# Patient Record
Sex: FEMALE | Race: BLACK OR AFRICAN AMERICAN | Employment: OTHER | URBAN - METROPOLITAN AREA
[De-identification: names, ages, dates, MRNs, and addresses within clinical notes are randomized per-mention and may not be internally consistent; named-entity substitution may affect disease eponyms.]

---

## 2017-01-03 ENCOUNTER — TELEPHONE (OUTPATIENT)
Dept: FAMILY MEDICINE CLINIC | Facility: CLINIC | Age: 72
End: 2017-01-03

## 2017-01-03 ENCOUNTER — OFFICE VISIT (OUTPATIENT)
Dept: SURGERY | Facility: CLINIC | Age: 72
End: 2017-01-03

## 2017-01-03 VITALS — WEIGHT: 220 LBS | TEMPERATURE: 99 F | BODY MASS INDEX: 38.98 KG/M2 | HEIGHT: 63 IN

## 2017-01-03 DIAGNOSIS — K91.89 POSTOPERATIVE BILE LEAK: Primary | ICD-10-CM

## 2017-01-03 DIAGNOSIS — K80.50 BILIARY COLIC: ICD-10-CM

## 2017-01-03 DIAGNOSIS — K83.8 POSTOPERATIVE BILE LEAK: Primary | ICD-10-CM

## 2017-01-03 DIAGNOSIS — M96.1 POSTLAMINECTOMY SYNDROME, LUMBAR REGION: Primary | ICD-10-CM

## 2017-01-03 DIAGNOSIS — K80.20 CALCULUS OF GALLBLADDER WITHOUT CHOLECYSTITIS WITHOUT OBSTRUCTION: ICD-10-CM

## 2017-01-03 PROCEDURE — 99024 POSTOP FOLLOW-UP VISIT: CPT | Performed by: COLON & RECTAL SURGERY

## 2017-01-03 RX ORDER — HYDROCODONE BITARTRATE AND ACETAMINOPHEN 10; 325 MG/1; MG/1
1 TABLET ORAL EVERY 8 HOURS PRN
Qty: 90 TABLET | Refills: 0 | Status: SHIPPED | OUTPATIENT
Start: 2017-01-03 | End: 2017-02-21

## 2017-01-03 NOTE — TELEPHONE ENCOUNTER
According to the [facility] prescribing monitoring website her last prescription was 11 27 of 2016 for 90. There does not appear to be a prescription filled at the end of December. Okay to refill.

## 2017-01-03 NOTE — PROGRESS NOTES
Post Operative Visit Note       Active Problems  1. Postoperative bile leak    2. Biliary colic    3.  Calculus of gallbladder without cholecystitis without obstruction         Chief Complaint   Post-Op     History of Present Illness   This patient presents neck    HYSTERECTOMY      OTHER SURGICAL HISTORY      Comment stimulator,spine x 2    OTHER SURGICAL HISTORY      Comment right heal spur    OTHER SURGICAL HISTORY      Comment bilateral carpal tunnel    KNEE REPLACEMENT SURGERY      Comment left knee, the Dexlansoprazole (DEXILANT) 60 MG Oral Capsule Delayed Release TAKE ONE CAPSULE BY MOUTH ONCE DAILY Disp: 90 capsule Rfl: 1   topiramate 100 MG Oral Tab Take 1 tablet (100 mg total) by mouth 2 (two) times daily.  Migraine prevention Disp: 180 tablet Rfl: 1 total) by mouth daily. Take one capsule by mouth every day Disp: 90 capsule Rfl: 3   Fenofibrate (TRICOR) 48 MG Oral Tab Take 1 tablet (48 mg total) by mouth daily.  Disp: 30 tablet Rfl: 1   Nebulizer Does not apply Misc Nebulize with albuterol, 0.083%, 3 m palpable. Her BMI is 38.98. Bowel sounds have normal activity and normal pitch. This patient is failing to improve after laparoscopic cholecystectomy on December 21, 2016.       Assessment    Assessment   Postoperative bile leak  (primary encounter lyudmila Up  Return if symptoms worsen or fail to improve.     Timo Weathers MD

## 2017-01-03 NOTE — TELEPHONE ENCOUNTER
pt says pharmacy did not get her norco script, but she claims she gave it to them. Please call, pt says she wants new rx.      Pt states she took her [place] script to the pharmacy in November because she was going to be moving to [place] the 1st of the year so sh

## 2017-01-04 PROBLEM — K83.8 POSTOPERATIVE BILE LEAK: Status: ACTIVE | Noted: 2017-01-04

## 2017-01-04 PROBLEM — K91.89 POSTOPERATIVE BILE LEAK: Status: ACTIVE | Noted: 2017-01-04

## 2017-01-04 NOTE — PATIENT INSTRUCTIONS
This patient presents for postoperative care regarding a laparoscopic cholecystectomy with cholangiogram performed and December 21, 2016. She remains with abdominal pain. At the time of her operation, she had extensive abdominal adhesions.   These req

## 2017-01-06 ENCOUNTER — LAB ENCOUNTER (OUTPATIENT)
Dept: LAB | Age: 72
End: 2017-01-06
Attending: COLON & RECTAL SURGERY
Payer: MEDICARE

## 2017-01-06 ENCOUNTER — TELEPHONE (OUTPATIENT)
Dept: FAMILY MEDICINE CLINIC | Facility: CLINIC | Age: 72
End: 2017-01-06

## 2017-01-06 ENCOUNTER — HOSPITAL ENCOUNTER (OUTPATIENT)
Dept: NUCLEAR MEDICINE | Facility: HOSPITAL | Age: 72
Discharge: HOME | End: 2017-01-06
Attending: COLON & RECTAL SURGERY
Payer: MEDICARE

## 2017-01-06 DIAGNOSIS — K91.89 POSTOPERATIVE BILE LEAK: ICD-10-CM

## 2017-01-06 DIAGNOSIS — K83.8 POSTOPERATIVE BILE LEAK: ICD-10-CM

## 2017-01-06 LAB
ALBUMIN SERPL-MCNC: 3.8 G/DL (ref 3.5–4.8)
ALP LIVER SERPL-CCNC: 76 U/L (ref 55–142)
ALT SERPL-CCNC: 18 U/L (ref 14–54)
AST SERPL-CCNC: 13 U/L (ref 15–41)
BASOPHILS # BLD AUTO: 0.03 X10(3) UL (ref 0–0.1)
BASOPHILS NFR BLD AUTO: 0.4 %
BILIRUB SERPL-MCNC: 0.4 MG/DL (ref 0.1–2)
BUN BLD-MCNC: 17 MG/DL (ref 8–20)
CALCIUM BLD-MCNC: 9 MG/DL (ref 8.3–10.3)
CHLORIDE: 102 MMOL/L (ref 101–111)
CO2: 33 MMOL/L (ref 22–32)
CREAT BLD-MCNC: 1 MG/DL (ref 0.55–1.02)
EOSINOPHIL # BLD AUTO: 0.12 X10(3) UL (ref 0–0.3)
EOSINOPHIL NFR BLD AUTO: 1.5 %
ERYTHROCYTE [DISTWIDTH] IN BLOOD BY AUTOMATED COUNT: 14.2 % (ref 11.5–16)
GLUCOSE BLD-MCNC: 141 MG/DL (ref 70–99)
HCT VFR BLD AUTO: 30.1 % (ref 34–50)
HGB BLD-MCNC: 9.7 G/DL (ref 12–16)
IMMATURE GRANULOCYTE COUNT: 0.04 X10(3) UL (ref 0–1)
IMMATURE GRANULOCYTE RATIO %: 0.5 %
LYMPHOCYTES # BLD AUTO: 1.26 X10(3) UL (ref 0.9–4)
LYMPHOCYTES NFR BLD AUTO: 15.8 %
M PROTEIN MFR SERPL ELPH: 8.4 G/DL (ref 6.1–8.3)
MCH RBC QN AUTO: 30.9 PG (ref 27–33.2)
MCHC RBC AUTO-ENTMCNC: 32.2 G/DL (ref 31–37)
MCV RBC AUTO: 95.9 FL (ref 81–100)
MONOCYTES # BLD AUTO: 0.59 X10(3) UL (ref 0.1–0.6)
MONOCYTES NFR BLD AUTO: 7.4 %
NEUTROPHIL ABS PRELIM: 5.96 X10 (3) UL (ref 1.3–6.7)
NEUTROPHILS # BLD AUTO: 5.96 X10(3) UL (ref 1.3–6.7)
NEUTROPHILS NFR BLD AUTO: 74.4 %
PLATELET # BLD AUTO: 453 10(3)UL (ref 150–450)
POTASSIUM SERPL-SCNC: 3.7 MMOL/L (ref 3.6–5.1)
RBC # BLD AUTO: 3.14 X10(6)UL (ref 3.8–5.1)
RED CELL DISTRIBUTION WIDTH-SD: 49.6 FL (ref 35.1–46.3)
SODIUM SERPL-SCNC: 140 MMOL/L (ref 136–144)
WBC # BLD AUTO: 8 X10(3) UL (ref 4–13)

## 2017-01-06 PROCEDURE — 78226 HEPATOBILIARY SYSTEM IMAGING: CPT

## 2017-01-06 PROCEDURE — 80053 COMPREHEN METABOLIC PANEL: CPT

## 2017-01-06 PROCEDURE — 36415 COLL VENOUS BLD VENIPUNCTURE: CPT

## 2017-01-06 PROCEDURE — 85025 COMPLETE CBC W/AUTO DIFF WBC: CPT

## 2017-01-07 NOTE — TELEPHONE ENCOUNTER
Prescription order for ROLLATOR walker has been faxed to Amie Drum[facility] [phone]    Account # [id number]   Patient is aware  Original order sent to scan. Task is done.

## 2017-01-08 NOTE — PROGRESS NOTES
Quick Note:    This patient's X-ray has been completed. If patient had an appointment when X-ray results were available, no action is necessary.   If there was no appointment when results were obtained, please schedule an appointment.    ______

## 2017-01-09 ENCOUNTER — TELEPHONE (OUTPATIENT)
Dept: FAMILY MEDICINE CLINIC | Facility: CLINIC | Age: 72
End: 2017-01-09

## 2017-01-18 ENCOUNTER — OFFICE VISIT (OUTPATIENT)
Dept: FAMILY MEDICINE CLINIC | Facility: CLINIC | Age: 72
End: 2017-01-18

## 2017-01-18 VITALS
DIASTOLIC BLOOD PRESSURE: 78 MMHG | WEIGHT: 225 LBS | OXYGEN SATURATION: 98 % | SYSTOLIC BLOOD PRESSURE: 136 MMHG | HEIGHT: 63 IN | RESPIRATION RATE: 16 BRPM | BODY MASS INDEX: 39.87 KG/M2 | TEMPERATURE: 98 F | HEART RATE: 78 BPM

## 2017-01-18 DIAGNOSIS — Z79.84 LONG TERM CURRENT USE OF ORAL HYPOGLYCEMIC DRUG: ICD-10-CM

## 2017-01-18 DIAGNOSIS — E11.42 DIABETIC POLYNEUROPATHY ASSOCIATED WITH TYPE 2 DIABETES MELLITUS (HCC): Primary | ICD-10-CM

## 2017-01-18 DIAGNOSIS — K21.9 CHRONIC GERD: ICD-10-CM

## 2017-01-18 PROCEDURE — 99213 OFFICE O/P EST LOW 20 MIN: CPT | Performed by: FAMILY MEDICINE

## 2017-01-18 RX ORDER — GABAPENTIN 800 MG/1
800 TABLET ORAL 3 TIMES DAILY
Qty: 90 TABLET | Refills: 2 | Status: SHIPPED | OUTPATIENT
Start: 2017-01-18 | End: 2017-05-06

## 2017-01-18 RX ORDER — OMEPRAZOLE 40 MG/1
40 CAPSULE, DELAYED RELEASE ORAL DAILY
Qty: 30 CAPSULE | Refills: 2 | Status: SHIPPED | OUTPATIENT
Start: 2017-01-18 | End: 2017-04-18

## 2017-01-18 NOTE — PROGRESS NOTES
HPI:    Patient ID: Magdiel Peralta is a 66year old female. HPI Comments: Chronic diabetic neuropathy and chronic lumbar back pain. Having neuropathic pain in the feet. sypmtoms were improved with gabapentin, but not its now working as well as before. tablet Rfl: 3   Cyclobenzaprine HCl 10 MG Oral Tab Take 1 tablet (10 mg total) by mouth 3 (three) times daily.  Take one tablet by mouth three times daily Disp: 270 tablet Rfl: 3   Metoprolol-Hydrochlorothiazide 100-25 MG Oral Tab Take 1 tablet by mouth shana Exam reveals no gallop and no friction rub. No murmur heard. Pulmonary/Chest: Effort normal and breath sounds normal. No respiratory distress. She has no wheezes. She has no rales. Abdominal: Soft.  Bowel sounds are normal. She exhibits no distension

## 2017-01-19 NOTE — PROGRESS NOTES
Quick Note:    Your appointments    Date & Time Appointment Department [facility])    Tuesday January 24, 2017 2:30 PM Exam - Established Patient with Daniel Stearns MD [facility] [address] ([address] [place] [address])       ______

## 2017-01-24 ENCOUNTER — OFFICE VISIT (OUTPATIENT)
Dept: SURGERY | Facility: CLINIC | Age: 72
End: 2017-01-24

## 2017-01-24 VITALS
BODY MASS INDEX: 39.87 KG/M2 | TEMPERATURE: 99 F | DIASTOLIC BLOOD PRESSURE: 70 MMHG | HEIGHT: 63 IN | SYSTOLIC BLOOD PRESSURE: 138 MMHG | WEIGHT: 225 LBS | HEART RATE: 75 BPM

## 2017-01-24 DIAGNOSIS — K80.20 CALCULUS OF GALLBLADDER WITHOUT CHOLECYSTITIS WITHOUT OBSTRUCTION: Primary | ICD-10-CM

## 2017-01-24 DIAGNOSIS — K80.50 BILIARY COLIC: ICD-10-CM

## 2017-01-24 DIAGNOSIS — R10.11 RIGHT UPPER QUADRANT ABDOMINAL PAIN: ICD-10-CM

## 2017-01-24 PROBLEM — R10.9 ABDOMINAL PAIN: Status: ACTIVE | Noted: 2017-01-24

## 2017-01-24 PROBLEM — K83.8 POSTOPERATIVE BILE LEAK: Status: RESOLVED | Noted: 2017-01-04 | Resolved: 2017-01-24

## 2017-01-24 PROBLEM — K91.89 POSTOPERATIVE BILE LEAK: Status: RESOLVED | Noted: 2017-01-04 | Resolved: 2017-01-24

## 2017-01-24 PROCEDURE — 99024 POSTOP FOLLOW-UP VISIT: CPT | Performed by: COLON & RECTAL SURGERY

## 2017-01-24 NOTE — PROGRESS NOTES
Follow Up Visit Note       Active Problems      1. Calculus of gallbladder without cholecystitis without obstruction    2. Biliary colic    3.  Right upper quadrant abdominal pain          Chief Complaint   Follow - Up    History of Present Illness  This pa unspecified whether generalized or localized, unspecified site      generalized   • Esophageal reflux    • Hyperlipidemia          Past Surgical History    APPENDECTOMY      BACK SURGERY      Comment x6     BACK SURGERY      Comment x 2 neck    HYSTERECTOM total) by mouth daily. Disp: 30 capsule Rfl: 2   HYDROCODONE-ACETAMINOPHEN  MG Oral Tab Take 1 tablet by mouth every 8 (eight) hours as needed for Pain.  Disp: 90 tablet Rfl: 0   SUMAtriptan Succinate 50 MG Oral Tab Take 1 tablet (50 mg total) by mout Disp: 180 tablet Rfl: 3   celecoxib (CELEBREX) 200 MG Oral Cap Take 1 capsule (200 mg total) by mouth daily. Take one capsule by mouth every day Disp: 90 capsule Rfl: 3   Fenofibrate (TRICOR) 48 MG Oral Tab Take 1 tablet (48 mg total) by mouth daily.  Disp: exhibits no distension and no mass. There is no hepatosplenomegaly. There is no tenderness. There is no rebound and no guarding. No hernia. The patient is awake, alert, in no acute distress.   Her abdomen is soft, very minimally tender to deep palpati operation she had extensive abdominal adhesions which required significant lysis along the midline epigastrium all the way to the pelvis. The patient states that since her last visit she has dramatically improved.   She has occasional abdominal pain loca with this patient at this time. This patient can see me on an as-needed basis. This patient should return urgently for any problems or complications related to the surgical intervention. The patient voiced understanding and agreement with this plan.

## 2017-01-28 NOTE — PATIENT INSTRUCTIONS
This patient presents for continued evaluation and treatment following laparoscopic cholecystectomy with intraoperative cholangiogram performed on December 21, 2016.   At the time of the patient's operation she had extensive abdominal adhesions which requir worsening diarrhea, loose watery stools, or any increasing abdominal pain. Additionally, she should call our office with any further questions or concerns. I have no further follow-up scheduled with this patient at this time.  This patient can see me on

## 2017-02-02 PROBLEM — N18.2 CONTROLLED TYPE 2 DIABETES MELLITUS WITH STAGE 2 CHRONIC KIDNEY DISEASE (HCC): Status: ACTIVE | Noted: 2017-02-02

## 2017-02-02 PROBLEM — E11.22 CONTROLLED TYPE 2 DIABETES MELLITUS WITH STAGE 2 CHRONIC KIDNEY DISEASE: Status: ACTIVE | Noted: 2017-02-02

## 2017-02-02 PROBLEM — H40.1190 PRIMARY OPEN ANGLE GLAUCOMA: Status: ACTIVE | Noted: 2017-02-02

## 2017-02-02 PROBLEM — N18.2 CONTROLLED TYPE 2 DIABETES MELLITUS WITH STAGE 2 CHRONIC KIDNEY DISEASE: Status: ACTIVE | Noted: 2017-02-02

## 2017-02-02 PROBLEM — E11.22 CONTROLLED TYPE 2 DIABETES MELLITUS WITH STAGE 2 CHRONIC KIDNEY DISEASE (HCC): Status: ACTIVE | Noted: 2017-02-02

## 2017-02-02 PROBLEM — E11.311 DIABETIC MACULAR EDEMA (HCC): Status: ACTIVE | Noted: 2017-02-02

## 2017-02-07 ENCOUNTER — TELEPHONE (OUTPATIENT)
Dept: FAMILY MEDICINE CLINIC | Facility: CLINIC | Age: 72
End: 2017-02-07

## 2017-02-07 DIAGNOSIS — H40.9 GLAUCOMA, UNSPECIFIED GLAUCOMA, UNSPECIFIED LATERALITY: Primary | ICD-10-CM

## 2017-02-07 NOTE — TELEPHONE ENCOUNTER
PLEASE ENTER A REFERRAL FOR PATIENT TO SEE DR. SIMMONS  (OPTHO)    DX:  GLAUCOMA    2 VISITS    PT HAS APPT TODAY. PLEASE ENTER ASAP.

## 2017-02-15 ENCOUNTER — OFFICE VISIT (OUTPATIENT)
Dept: FAMILY MEDICINE CLINIC | Facility: CLINIC | Age: 72
End: 2017-02-15

## 2017-02-15 ENCOUNTER — HOSPITAL ENCOUNTER (OUTPATIENT)
Dept: GENERAL RADIOLOGY | Age: 72
Discharge: HOME | End: 2017-02-15
Attending: FAMILY MEDICINE
Payer: MEDICARE

## 2017-02-15 ENCOUNTER — APPOINTMENT (OUTPATIENT)
Dept: LAB | Age: 72
End: 2017-02-15
Attending: FAMILY MEDICINE
Payer: MEDICARE

## 2017-02-15 VITALS
WEIGHT: 231 LBS | HEIGHT: 63 IN | TEMPERATURE: 98 F | HEART RATE: 89 BPM | OXYGEN SATURATION: 97 % | SYSTOLIC BLOOD PRESSURE: 130 MMHG | DIASTOLIC BLOOD PRESSURE: 74 MMHG | BODY MASS INDEX: 40.93 KG/M2 | RESPIRATION RATE: 20 BRPM

## 2017-02-15 DIAGNOSIS — E11.42 CONTROLLED TYPE 2 DIABETES MELLITUS WITH DIABETIC POLYNEUROPATHY, WITHOUT LONG-TERM CURRENT USE OF INSULIN (HCC): ICD-10-CM

## 2017-02-15 DIAGNOSIS — M79.671 RIGHT FOOT PAIN: ICD-10-CM

## 2017-02-15 DIAGNOSIS — E78.00 PURE HYPERCHOLESTEROLEMIA: ICD-10-CM

## 2017-02-15 DIAGNOSIS — I10 ESSENTIAL HYPERTENSION: ICD-10-CM

## 2017-02-15 DIAGNOSIS — M25.551 HIP PAIN, RIGHT: Primary | ICD-10-CM

## 2017-02-15 DIAGNOSIS — M25.551 HIP PAIN, RIGHT: ICD-10-CM

## 2017-02-15 DIAGNOSIS — N28.1 RENAL CYST, ACQUIRED, RIGHT: ICD-10-CM

## 2017-02-15 LAB
ALBUMIN SERPL-MCNC: 3.8 G/DL (ref 3.5–4.8)
ALP LIVER SERPL-CCNC: 74 U/L (ref 55–142)
ALT SERPL-CCNC: 20 U/L (ref 14–54)
AST SERPL-CCNC: 13 U/L (ref 15–41)
BILIRUB SERPL-MCNC: 0.3 MG/DL (ref 0.1–2)
BUN BLD-MCNC: 15 MG/DL (ref 8–20)
CALCIUM BLD-MCNC: 8.7 MG/DL (ref 8.3–10.3)
CHLORIDE: 101 MMOL/L (ref 101–111)
CHOLEST SMN-MCNC: 238 MG/DL (ref ?–200)
CO2: 30 MMOL/L (ref 22–32)
CREAT BLD-MCNC: 1.05 MG/DL (ref 0.55–1.02)
CREAT UR-SCNC: 289 MG/DL
EST. AVERAGE GLUCOSE BLD GHB EST-MCNC: 131 MG/DL (ref 68–126)
GLUCOSE BLD-MCNC: 140 MG/DL (ref 70–99)
HBA1C MFR BLD HPLC: 6.2 % (ref ?–5.7)
HDLC SERPL-MCNC: 54 MG/DL (ref 45–?)
HDLC SERPL: 4.41 {RATIO} (ref ?–4.44)
LDLC SERPL CALC-MCNC: 155 MG/DL (ref ?–130)
M PROTEIN MFR SERPL ELPH: 8 G/DL (ref 6.1–8.3)
MICROALBUMIN UR-MCNC: 1.38 MG/DL
MICROALBUMIN/CREAT 24H UR-RTO: 4.8 UG/MG (ref ?–30)
NONHDLC SERPL-MCNC: 184 MG/DL (ref ?–130)
POTASSIUM SERPL-SCNC: 3.9 MMOL/L (ref 3.6–5.1)
SODIUM SERPL-SCNC: 138 MMOL/L (ref 136–144)
TRIGLYCERIDES: 146 MG/DL (ref ?–150)
VLDL: 29 MG/DL (ref 5–40)

## 2017-02-15 PROCEDURE — 72190 X-RAY EXAM OF PELVIS: CPT

## 2017-02-15 PROCEDURE — 73630 X-RAY EXAM OF FOOT: CPT

## 2017-02-15 PROCEDURE — 82570 ASSAY OF URINE CREATININE: CPT

## 2017-02-15 PROCEDURE — 99214 OFFICE O/P EST MOD 30 MIN: CPT | Performed by: FAMILY MEDICINE

## 2017-02-15 PROCEDURE — 80061 LIPID PANEL: CPT

## 2017-02-15 PROCEDURE — 83036 HEMOGLOBIN GLYCOSYLATED A1C: CPT

## 2017-02-15 PROCEDURE — 80053 COMPREHEN METABOLIC PANEL: CPT

## 2017-02-15 PROCEDURE — 36415 COLL VENOUS BLD VENIPUNCTURE: CPT

## 2017-02-15 PROCEDURE — 82043 UR ALBUMIN QUANTITATIVE: CPT

## 2017-02-15 NOTE — PROGRESS NOTES
Here for pain in the right lower back. She has a history of spinal surgery but never wants to go through that again. She has recently had the batteries replaced for her spine stimulator. That is on the left side. This pain is on the right low back.   It HISTORY:      Past Surgical History    APPENDECTOMY      BACK SURGERY      Comment x6     BACK SURGERY      Comment x 2 neck    HYSTERECTOMY      OTHER SURGICAL HISTORY      Comment stimulator,spine x 2    OTHER SURGICAL HISTORY      Comment right heal spu Metoprolol-Hydrochlorothiazide 100-25 MG Oral Tab Take 1 tablet by mouth daily. Disp: 90 tablet Rfl: 3   Albuterol Sulfate (2.5 MG/3ML) 0.083% Inhalation Nebu Soln Take 3 mL (2.5 mg total) by nebulization every 4 (four) hours as needed for Wheezing.  Disp PHYSICAL EXAM:  /74 mmHg  Pulse 89  Temp(Src) 97.8 °F (36.6 °C) (Oral)  Resp 20  Ht 63\"  Wt 231 lb  BMI 40.93 kg/m2  SpO2 97%  In general no acute distress.  about 5-6 inches lateral from the midline on the right.   No redness tony

## 2017-02-16 ENCOUNTER — TELEPHONE (OUTPATIENT)
Dept: FAMILY MEDICINE CLINIC | Facility: CLINIC | Age: 72
End: 2017-02-16

## 2017-02-16 ENCOUNTER — HOSPITAL ENCOUNTER (OUTPATIENT)
Dept: ULTRASOUND IMAGING | Age: 72
Discharge: HOME | End: 2017-02-16
Attending: FAMILY MEDICINE
Payer: MEDICARE

## 2017-02-16 DIAGNOSIS — M79.673 PAIN OF FOOT, UNSPECIFIED LATERALITY: Primary | ICD-10-CM

## 2017-02-16 DIAGNOSIS — M79.671 RIGHT FOOT PAIN: ICD-10-CM

## 2017-02-16 DIAGNOSIS — N28.1 RENAL CYST, ACQUIRED, RIGHT: ICD-10-CM

## 2017-02-16 PROCEDURE — 76775 US EXAM ABDO BACK WALL LIM: CPT

## 2017-02-16 NOTE — TELEPHONE ENCOUNTER
----- Message from Kirstin Hartley MD sent at 2/15/2017 10:53 AM CST -----  Both the pelvis and foot show osteoarthritis but no acute findings. I would like her to see Dr. Masha Cadena from podiatry for further evaluation of the foot pain.   An MRI of the foot

## 2017-02-16 NOTE — TELEPHONE ENCOUNTER
----- Message from Destinee Cano MD sent at 2/15/2017 10:53 AM CST -----  Both the pelvis and foot show osteoarthritis but no acute findings. I would like her to see Dr. Yuli Chu from podiatry for further evaluation of the foot pain.   An MRI of the foot

## 2017-02-17 PROBLEM — M85.839 OSTEOPENIA OF FOREARM: Status: ACTIVE | Noted: 2017-02-17

## 2017-02-20 ENCOUNTER — TELEPHONE (OUTPATIENT)
Dept: FAMILY MEDICINE CLINIC | Facility: CLINIC | Age: 72
End: 2017-02-20

## 2017-02-20 NOTE — TELEPHONE ENCOUNTER
Results given to pt, pt is going to make a follow up appt with Dr. Diamante Flores to discuss this and a few other issues.

## 2017-02-21 ENCOUNTER — TELEPHONE (OUTPATIENT)
Dept: FAMILY MEDICINE CLINIC | Facility: CLINIC | Age: 72
End: 2017-02-21

## 2017-02-21 DIAGNOSIS — M96.1 POSTLAMINECTOMY SYNDROME, LUMBAR REGION: ICD-10-CM

## 2017-02-21 DIAGNOSIS — N20.0 KIDNEY STONE: Primary | ICD-10-CM

## 2017-02-21 RX ORDER — HYDROCODONE BITARTRATE AND ACETAMINOPHEN 10; 325 MG/1; MG/1
1 TABLET ORAL EVERY 8 HOURS PRN
Qty: 90 TABLET | Refills: 0 | Status: SHIPPED | COMMUNITY
Start: 2017-02-21 | End: 2017-06-07 | Stop reason: ALTCHOICE

## 2017-02-21 NOTE — TELEPHONE ENCOUNTER
Dr. Jeffery Lezama at all at [place] [address] [facility]. Tramadol 50 mg 1 tablet every 6 hours as needed for pain, #30.

## 2017-02-25 RX ORDER — VENLAFAXINE HYDROCHLORIDE 150 MG/1
150 CAPSULE, EXTENDED RELEASE ORAL DAILY
Qty: 90 CAPSULE | Refills: 0 | Status: SHIPPED | OUTPATIENT
Start: 2017-02-25 | End: 2017-09-10

## 2017-02-25 RX ORDER — GLIMEPIRIDE 4 MG/1
4 TABLET ORAL 2 TIMES DAILY
Qty: 180 TABLET | Refills: 0 | Status: SHIPPED | OUTPATIENT
Start: 2017-02-25 | End: 2017-05-11

## 2017-02-28 ENCOUNTER — TELEPHONE (OUTPATIENT)
Dept: FAMILY MEDICINE CLINIC | Facility: CLINIC | Age: 72
End: 2017-02-28

## 2017-03-08 PROBLEM — E11.22 TYPE 2 DIABETES MELLITUS WITH STAGE 2 CHRONIC KIDNEY DISEASE (HCC): Status: ACTIVE | Noted: 2017-02-02

## 2017-03-08 PROBLEM — E11.22 TYPE 2 DIABETES MELLITUS WITH STAGE 2 CHRONIC KIDNEY DISEASE: Status: ACTIVE | Noted: 2017-02-02

## 2017-03-08 PROBLEM — N18.2 TYPE 2 DIABETES MELLITUS WITH STAGE 2 CHRONIC KIDNEY DISEASE (HCC): Status: ACTIVE | Noted: 2017-02-02

## 2017-03-08 PROBLEM — I70.0 THORACIC AORTA ATHEROSCLEROSIS (HCC): Status: ACTIVE | Noted: 2017-03-08

## 2017-03-08 PROBLEM — N18.2 TYPE 2 DIABETES MELLITUS WITH STAGE 2 CHRONIC KIDNEY DISEASE: Status: ACTIVE | Noted: 2017-02-02

## 2017-03-14 RX ORDER — BLOOD SUGAR DIAGNOSTIC
STRIP MISCELLANEOUS
Qty: 200 STRIP | Refills: 1 | Status: SHIPPED | OUTPATIENT
Start: 2017-03-14 | End: 2017-09-13

## 2017-03-23 PROBLEM — I25.10 CORONARY ARTERY CALCIFICATION: Status: ACTIVE | Noted: 2017-03-23

## 2017-03-23 PROBLEM — I25.84 CORONARY ARTERY CALCIFICATION: Status: ACTIVE | Noted: 2017-03-23

## 2017-03-23 RX ORDER — CLOPIDOGREL BISULFATE 75 MG/1
TABLET ORAL
Qty: 90 TABLET | Refills: 0 | Status: SHIPPED | OUTPATIENT
Start: 2017-03-23 | End: 2017-06-30

## 2017-03-31 ENCOUNTER — HOSPITAL ENCOUNTER (OUTPATIENT)
Dept: GENERAL RADIOLOGY | Age: 72
Discharge: HOME | End: 2017-03-31
Attending: UROLOGY
Payer: MEDICARE

## 2017-03-31 DIAGNOSIS — N20.0 KIDNEY STONE: ICD-10-CM

## 2017-03-31 PROCEDURE — 74000 XR ABDOMEN (1 VIEW) (CPT=74000): CPT

## 2017-04-01 PROBLEM — J84.10 CALCIFIED GRANULOMA OF LUNG (HCC): Status: ACTIVE | Noted: 2017-04-01

## 2017-04-01 PROBLEM — E11.311: Status: ACTIVE | Noted: 2017-04-01

## 2017-04-01 PROBLEM — E11.319 DIABETIC RETINOPATHY (HCC): Status: ACTIVE | Noted: 2017-04-01

## 2017-04-01 PROBLEM — E11.42 DIABETIC POLYNEUROPATHY (HCC): Status: ACTIVE | Noted: 2017-04-01

## 2017-04-03 ENCOUNTER — OFFICE VISIT (OUTPATIENT)
Dept: FAMILY MEDICINE CLINIC | Facility: CLINIC | Age: 72
End: 2017-04-03

## 2017-04-03 VITALS
WEIGHT: 233 LBS | BODY MASS INDEX: 41.29 KG/M2 | DIASTOLIC BLOOD PRESSURE: 88 MMHG | SYSTOLIC BLOOD PRESSURE: 132 MMHG | RESPIRATION RATE: 16 BRPM | HEART RATE: 78 BPM | HEIGHT: 63 IN | TEMPERATURE: 98 F

## 2017-04-03 DIAGNOSIS — M54.6 THORACIC BACK PAIN, UNSPECIFIED BACK PAIN LATERALITY, UNSPECIFIED CHRONICITY: Primary | ICD-10-CM

## 2017-04-03 DIAGNOSIS — M96.1 POSTLAMINECTOMY SYNDROME, LUMBAR REGION: ICD-10-CM

## 2017-04-03 PROBLEM — E11.311: Status: RESOLVED | Noted: 2017-04-01 | Resolved: 2017-04-03

## 2017-04-03 PROBLEM — J44.89 ASTHMA WITH COPD (CHRONIC OBSTRUCTIVE PULMONARY DISEASE): Chronic | Status: ACTIVE | Noted: 2017-04-03

## 2017-04-03 PROBLEM — J44.9 ASTHMA WITH COPD (CHRONIC OBSTRUCTIVE PULMONARY DISEASE) (HCC): Chronic | Status: ACTIVE | Noted: 2017-04-03

## 2017-04-03 PROBLEM — R10.9 ABDOMINAL PAIN: Status: RESOLVED | Noted: 2017-01-24 | Resolved: 2017-04-03

## 2017-04-03 PROBLEM — J44.89 ASTHMA WITH COPD (CHRONIC OBSTRUCTIVE PULMONARY DISEASE) (HCC): Chronic | Status: ACTIVE | Noted: 2017-04-03

## 2017-04-03 PROCEDURE — 99213 OFFICE O/P EST LOW 20 MIN: CPT | Performed by: FAMILY MEDICINE

## 2017-04-03 RX ORDER — HYDROCODONE BITARTRATE AND ACETAMINOPHEN 10; 325 MG/1; MG/1
1 TABLET ORAL EVERY 8 HOURS PRN
Qty: 90 TABLET | Refills: 0 | Status: CANCELLED | OUTPATIENT
Start: 2017-04-03 | End: 2017-05-03

## 2017-04-03 RX ORDER — HYDROCODONE BITARTRATE AND ACETAMINOPHEN 10; 325 MG/1; MG/1
1 TABLET ORAL EVERY 8 HOURS PRN
Qty: 90 TABLET | Refills: 0 | Status: SHIPPED | COMMUNITY
Start: 2017-05-03 | End: 2017-04-27

## 2017-04-03 RX ORDER — HYDROCODONE BITARTRATE AND ACETAMINOPHEN 10; 325 MG/1; MG/1
1 TABLET ORAL EVERY 8 HOURS PRN
Qty: 90 TABLET | Refills: 0 | Status: SHIPPED | COMMUNITY
Start: 2017-04-03 | End: 2017-07-28

## 2017-04-03 RX ORDER — HYDROCODONE BITARTRATE AND ACETAMINOPHEN 10; 325 MG/1; MG/1
1 TABLET ORAL EVERY 8 HOURS PRN
Qty: 90 TABLET | Refills: 0 | Status: SHIPPED | COMMUNITY
Start: 2017-06-02 | End: 2017-04-27

## 2017-04-03 NOTE — PROGRESS NOTES
Here requesting a refill of Norco for back pain. She had cholecystectomy. Incidental finding of a kidney stone on the left. Her pain is predominantly on the right. It is about midthoracic.   She had an ultrasound showing a 6 mm left renal calculus and a & infection    NEEDLE BIOPSY LEFT      AKI BIOPSY STEREOTACTIC NODULE 2 SITE BILAT  2008    Comment Benign    TOTAL KNEE REPLACEMENT      LAPAROSCOPIC CHOLECYSTECTOMY N/A 12/21/2016    Comment Procedure: LAPAROSCOPIC CHOLECYSTECTOMY;  Surgeon: Robb Ellis Device by Other route 2 (two) times daily. Disp: 1 kit Rfl: 0   Pioglitazone HCl 30 MG Oral Tab TAKE ONE TABLET BY MOUTH ONCE DAILY Disp: 90 tablet Rfl: 3   Cyclobenzaprine HCl 10 MG Oral Tab Take 1 tablet (10 mg total) by mouth 3 (three) times daily.  Take Temp(Src) 98.1 °F (36.7 °C) (Temporal)  Resp 16  Ht 63\"  Wt 233 lb  BMI 41.28 kg/m2    Obese female in no acute distress. Back surgical scar extends up to the region of her pain in the midline. She has no CVA tenderness.   There is no rashing or bruising

## 2017-04-11 ENCOUNTER — TELEPHONE (OUTPATIENT)
Dept: FAMILY MEDICINE CLINIC | Facility: CLINIC | Age: 72
End: 2017-04-11

## 2017-04-11 DIAGNOSIS — Z01.818 PRE-OP TESTING: Primary | ICD-10-CM

## 2017-04-13 ENCOUNTER — APPOINTMENT (OUTPATIENT)
Dept: LAB | Age: 72
End: 2017-04-13
Attending: FAMILY MEDICINE
Payer: MEDICARE

## 2017-04-13 ENCOUNTER — OFFICE VISIT (OUTPATIENT)
Dept: FAMILY MEDICINE CLINIC | Facility: CLINIC | Age: 72
End: 2017-04-13

## 2017-04-13 ENCOUNTER — LAB ENCOUNTER (OUTPATIENT)
Dept: LAB | Age: 72
End: 2017-04-13
Attending: UROLOGY
Payer: MEDICARE

## 2017-04-13 ENCOUNTER — TELEPHONE (OUTPATIENT)
Dept: FAMILY MEDICINE CLINIC | Facility: CLINIC | Age: 72
End: 2017-04-13

## 2017-04-13 VITALS
SYSTOLIC BLOOD PRESSURE: 132 MMHG | WEIGHT: 231 LBS | DIASTOLIC BLOOD PRESSURE: 76 MMHG | BODY MASS INDEX: 40.93 KG/M2 | RESPIRATION RATE: 18 BRPM | HEIGHT: 63 IN | HEART RATE: 83 BPM | OXYGEN SATURATION: 97 % | TEMPERATURE: 98 F

## 2017-04-13 DIAGNOSIS — M96.1 POSTLAMINECTOMY SYNDROME, LUMBAR REGION: ICD-10-CM

## 2017-04-13 DIAGNOSIS — Z01.818 PRE-OP TESTING: ICD-10-CM

## 2017-04-13 DIAGNOSIS — E11.42 TYPE 2 DIABETES MELLITUS WITH DIABETIC POLYNEUROPATHY, WITHOUT LONG-TERM CURRENT USE OF INSULIN (HCC): ICD-10-CM

## 2017-04-13 DIAGNOSIS — Z01.810 PREOPERATIVE CARDIOVASCULAR EXAMINATION: ICD-10-CM

## 2017-04-13 DIAGNOSIS — N20.0 KIDNEY STONE: ICD-10-CM

## 2017-04-13 DIAGNOSIS — R94.31 ABNORMAL FINDING ON EKG: Primary | ICD-10-CM

## 2017-04-13 DIAGNOSIS — J44.9 ASTHMA WITH COPD (CHRONIC OBSTRUCTIVE PULMONARY DISEASE) (HCC): Chronic | ICD-10-CM

## 2017-04-13 DIAGNOSIS — N20.0 NEPHROLITHIASIS: Primary | ICD-10-CM

## 2017-04-13 PROCEDURE — 99214 OFFICE O/P EST MOD 30 MIN: CPT | Performed by: FAMILY MEDICINE

## 2017-04-13 PROCEDURE — 93010 ELECTROCARDIOGRAM REPORT: CPT | Performed by: INTERNAL MEDICINE

## 2017-04-13 PROCEDURE — 81001 URINALYSIS AUTO W/SCOPE: CPT | Performed by: FAMILY MEDICINE

## 2017-04-13 PROCEDURE — 93005 ELECTROCARDIOGRAM TRACING: CPT

## 2017-04-13 NOTE — PROGRESS NOTES
HPI:  Here for pre-operative evaluation requested by Dr. Alba Dunn. Will have lithotrypsy at [facility] on 4/17/2017.     PAST MEDICAL HISTORY:  Past Medical History   Diagnosis Date   • Type II or unspecified type diabetes mellitus without mention of complicati Pain. Disp: 90 tablet Rfl: 0   CLOPIDOGREL BISULFATE 75 MG Oral Tab TAKE ONE TABLET BY MOUTH ONCE DAILY Disp: 90 tablet Rfl: 0   ACCU-CHEK BRIANNA PLUS In Vitro Strip TEST TWICE A DAY OR USE AS DIRECTED.  Disp: 200 strip Rfl: 1   glimepiride 4 MG Oral Tab Liliane Cancer daily. Disp: 90 tablet Rfl: 3   CloNIDine HCl (CATAPRES) 0.1 MG Oral Tab Take 1 tablet (0.1 mg total) by mouth 2 (two) times daily. Disp: 180 tablet Rfl: 3   celecoxib (CELEBREX) 200 MG Oral Cap Take 1 capsule (200 mg total) by mouth daily.  Take one capsul pattern. PSYCHE: treated    EXAM:  /76 mmHg  Pulse 83  Temp(Src) 98.1 °F (36.7 °C) (Oral)  Resp 18  Ht 63\"  Wt 231 lb  BMI 40.93 kg/m2  SpO2 97%  NAD  GENERAL: well developed, well nourished, in no apparent distress.   EARS: Bilateral pinna / tragus Office: prn

## 2017-04-13 NOTE — TELEPHONE ENCOUNTER
----- Message from Willow Rowell MD sent at 4/13/2017  2:06 PM CDT -----  New nonspecific ST-T wave change. Had a nuclear stress test in 2014. Would suggest repeat nuclear stress test prior to surgery to confirm no cardiac disease.

## 2017-04-13 NOTE — TELEPHONE ENCOUNTER
Pt informed of both test results and recommendations and she expressed understanding and agreement. Order entered, CS phone number given, confirmed pt's appointment for H+P today. Task completed.

## 2017-04-17 ENCOUNTER — ANESTHESIA EVENT (OUTPATIENT)
Dept: SURGERY | Facility: HOSPITAL | Age: 72
End: 2017-04-17
Payer: MEDICARE

## 2017-04-17 ENCOUNTER — APPOINTMENT (OUTPATIENT)
Dept: GENERAL RADIOLOGY | Facility: HOSPITAL | Age: 72
End: 2017-04-17
Attending: UROLOGY
Payer: MEDICARE

## 2017-04-17 ENCOUNTER — ANESTHESIA (OUTPATIENT)
Dept: SURGERY | Facility: HOSPITAL | Age: 72
End: 2017-04-17
Payer: MEDICARE

## 2017-04-17 ENCOUNTER — HOSPITAL ENCOUNTER (OUTPATIENT)
Facility: HOSPITAL | Age: 72
Setting detail: HOSPITAL OUTPATIENT SURGERY
Discharge: HOME | End: 2017-04-17
Attending: UROLOGY | Admitting: UROLOGY
Payer: MEDICARE

## 2017-04-17 ENCOUNTER — SURGERY (OUTPATIENT)
Age: 72
End: 2017-04-17

## 2017-04-17 VITALS
TEMPERATURE: 98 F | OXYGEN SATURATION: 98 % | HEART RATE: 56 BPM | SYSTOLIC BLOOD PRESSURE: 105 MMHG | WEIGHT: 227.06 LBS | HEIGHT: 63 IN | BODY MASS INDEX: 40.23 KG/M2 | DIASTOLIC BLOOD PRESSURE: 60 MMHG | RESPIRATION RATE: 18 BRPM

## 2017-04-17 PROCEDURE — BT141ZZ FLUOROSCOPY OF KIDNEYS, URETERS AND BLADDER USING LOW OSMOLAR CONTRAST: ICD-10-PCS | Performed by: UROLOGY

## 2017-04-17 PROCEDURE — 82962 GLUCOSE BLOOD TEST: CPT

## 2017-04-17 PROCEDURE — 74420 UROGRAPHY RTRGR +-KUB: CPT

## 2017-04-17 RX ORDER — DEXTROSE MONOHYDRATE 25 G/50ML
50 INJECTION, SOLUTION INTRAVENOUS
Status: DISCONTINUED | OUTPATIENT
Start: 2017-04-17 | End: 2017-04-17 | Stop reason: HOSPADM

## 2017-04-17 RX ORDER — SODIUM CHLORIDE, SODIUM LACTATE, POTASSIUM CHLORIDE, CALCIUM CHLORIDE 600; 310; 30; 20 MG/100ML; MG/100ML; MG/100ML; MG/100ML
INJECTION, SOLUTION INTRAVENOUS CONTINUOUS
Status: DISCONTINUED | OUTPATIENT
Start: 2017-04-17 | End: 2017-04-17

## 2017-04-17 RX ORDER — NALOXONE HYDROCHLORIDE 0.4 MG/ML
80 INJECTION, SOLUTION INTRAMUSCULAR; INTRAVENOUS; SUBCUTANEOUS AS NEEDED
Status: DISCONTINUED | OUTPATIENT
Start: 2017-04-17 | End: 2017-04-17

## 2017-04-17 RX ORDER — HYDROCODONE BITARTRATE AND ACETAMINOPHEN 5; 325 MG/1; MG/1
1 TABLET ORAL AS NEEDED
Status: DISCONTINUED | OUTPATIENT
Start: 2017-04-17 | End: 2017-04-17

## 2017-04-17 RX ORDER — HYDROCODONE BITARTRATE AND ACETAMINOPHEN 5; 325 MG/1; MG/1
2 TABLET ORAL AS NEEDED
Status: DISCONTINUED | OUTPATIENT
Start: 2017-04-17 | End: 2017-04-17

## 2017-04-17 RX ORDER — ACETAMINOPHEN 500 MG
1000 TABLET ORAL ONCE AS NEEDED
Status: DISCONTINUED | OUTPATIENT
Start: 2017-04-17 | End: 2017-04-17

## 2017-04-17 RX ORDER — MEPERIDINE HYDROCHLORIDE 25 MG/ML
12.5 INJECTION INTRAMUSCULAR; INTRAVENOUS; SUBCUTANEOUS AS NEEDED
Status: DISCONTINUED | OUTPATIENT
Start: 2017-04-17 | End: 2017-04-17

## 2017-04-17 RX ORDER — INSULIN ASPART 100 [IU]/ML
INJECTION, SOLUTION INTRAVENOUS; SUBCUTANEOUS ONCE
Status: DISCONTINUED | OUTPATIENT
Start: 2017-04-17 | End: 2017-04-17

## 2017-04-17 RX ORDER — HYDROMORPHONE HYDROCHLORIDE 1 MG/ML
0.4 INJECTION, SOLUTION INTRAMUSCULAR; INTRAVENOUS; SUBCUTANEOUS EVERY 5 MIN PRN
Status: DISCONTINUED | OUTPATIENT
Start: 2017-04-17 | End: 2017-04-17

## 2017-04-17 RX ORDER — ONDANSETRON 2 MG/ML
4 INJECTION INTRAMUSCULAR; INTRAVENOUS AS NEEDED
Status: DISCONTINUED | OUTPATIENT
Start: 2017-04-17 | End: 2017-04-17

## 2017-04-17 RX ORDER — DEXTROSE MONOHYDRATE 25 G/50ML
50 INJECTION, SOLUTION INTRAVENOUS
Status: DISCONTINUED | OUTPATIENT
Start: 2017-04-17 | End: 2017-04-17

## 2017-04-17 NOTE — OR NURSING
Pt meets d/c criteria but reports feeling tired and would like more time to rest. All instructions provided to daughter. Pt denies pain, denies N/V. VSS. Afebrile. Will continue to monitor pt. Call lt w/in reach.

## 2017-04-17 NOTE — OPERATIVE REPORT
[facility]  Brief Op Note    Ceron Adjutant Location: OR   CSN [id number] MRN [id number]   Admission Date 4/17/2017 Operation Date 4/17/2017   Attending Physician Nkechi Yang MD Operating Physician Gavino Goncalves MD     Pre-Operative Diagn

## 2017-04-17 NOTE — ANESTHESIA PREPROCEDURE EVALUATION
PRE-OP EVALUATION    Patient Name: Saritha Perry    Pre-op Diagnosis: KIDNEY STONE      Procedure(s):  CYSTOSCOPY, LEFT URETEROSCOPY, LASER LITHOTRIPSY, LEFT URETERAL STENT PLACEMENT     Surgeon(s) and Role:     * Bennie Lee MD - Primary    Pre- total) by mouth daily. Disp: 30 capsule Rfl: 2   SUMAtriptan Succinate 50 MG Oral Tab Take 1 tablet (50 mg total) by mouth every 2 (two) hours as needed for Migraine.  Disp: 9 tablet Rfl: 12   topiramate 100 MG Oral Tab Take 1 tablet (100 mg total) by mouth Complications  (-) history of anesthetic complications         GI/Hepatic/Renal      (+) GERD       (+) chronic renal disease                    Cardiovascular                (+) obesity  (+) hypertension   (+) hyperlipidemia 04/13/2017   CA 9.4 04/13/2017            Airway      Mallampati: II  Mouth opening: >3 FB  TM distance: > 6 cm  Neck ROM: full Cardiovascular      Rhythm: regular  Rate: normal     Dental      Dental appliance(s): upper dentures       Pulmonary    Pulmona

## 2017-04-17 NOTE — OR NURSING
Pt much more awake and less tired. Voided freely--clear, yellow urine. Denies pain. Will proceed w/ d/c to home.

## 2017-04-17 NOTE — OPERATIVE REPORT
[facility]    PATIENT'S NAME: Kate Mir   ATTENDING PHYSICIAN: Emily Hill M.D. OPERATING PHYSICIAN: Emily Hill M.D.    PATIENT ACCOUNT#:   [de-identified]    LOCATION:  [address]  MEDICAL RECORD #:   [id number]       DATE

## 2017-04-17 NOTE — H&P
[place] Patient Status:  [facility] Outpatient Surgery    1945 MRN [id number]   Location [address] Attending Bennie Lee MD   Hosp Day # 0 PCP Amadeo Fierro MD     History o • Cancer Brother      mesothelioma met to brain   • Diabetes Brother    • Cancer Son      brain, 1/2015   • Alcohol and Other Disorders Associated Son       reports that she has never smoked.  She has never used smokeless tobacco. She reports that she dri

## 2017-04-17 NOTE — ANESTHESIA POSTPROCEDURE EVALUATION
[id number] Patient Status:  [facility]   Age/Gender 66year old female MRN [id number]   [facility] SURGERY Attending Milvia Abreu MD   Hosp Day # 0 PCP Washington Lunsford MD       Anesthesia Post-op

## 2017-04-18 RX ORDER — OMEPRAZOLE 40 MG/1
CAPSULE, DELAYED RELEASE ORAL
Qty: 30 CAPSULE | Refills: 0 | Status: SHIPPED | OUTPATIENT
Start: 2017-04-18 | End: 2017-06-23

## 2017-04-21 ENCOUNTER — TELEPHONE (OUTPATIENT)
Dept: FAMILY MEDICINE CLINIC | Facility: CLINIC | Age: 72
End: 2017-04-21

## 2017-04-21 NOTE — TELEPHONE ENCOUNTER
Dr. Cornelius Jackson,  Patient states she has had [employer] person come out and meet her at your office. Do you know anything about this?

## 2017-04-27 ENCOUNTER — HOSPITAL ENCOUNTER (OUTPATIENT)
Dept: GENERAL RADIOLOGY | Age: 72
Discharge: HOME | End: 2017-04-27
Attending: FAMILY MEDICINE
Payer: MEDICARE

## 2017-04-27 ENCOUNTER — OFFICE VISIT (OUTPATIENT)
Dept: FAMILY MEDICINE CLINIC | Facility: CLINIC | Age: 72
End: 2017-04-27

## 2017-04-27 VITALS
WEIGHT: 233 LBS | OXYGEN SATURATION: 96 % | DIASTOLIC BLOOD PRESSURE: 78 MMHG | SYSTOLIC BLOOD PRESSURE: 138 MMHG | TEMPERATURE: 98 F | BODY MASS INDEX: 41.29 KG/M2 | HEIGHT: 63 IN | HEART RATE: 74 BPM | RESPIRATION RATE: 18 BRPM

## 2017-04-27 DIAGNOSIS — G89.29 CHRONIC PAIN OF LEFT KNEE: ICD-10-CM

## 2017-04-27 DIAGNOSIS — Z96.653 STATUS POST TOTAL BILATERAL KNEE REPLACEMENT: ICD-10-CM

## 2017-04-27 DIAGNOSIS — M25.562 CHRONIC PAIN OF LEFT KNEE: ICD-10-CM

## 2017-04-27 DIAGNOSIS — G89.29 CHRONIC PAIN OF LEFT KNEE: Primary | ICD-10-CM

## 2017-04-27 DIAGNOSIS — M25.562 CHRONIC PAIN OF LEFT KNEE: Primary | ICD-10-CM

## 2017-04-27 DIAGNOSIS — M72.2 PLANTAR FASCIITIS OF RIGHT FOOT: ICD-10-CM

## 2017-04-27 DIAGNOSIS — E11.42 DIABETIC POLYNEUROPATHY ASSOCIATED WITH TYPE 2 DIABETES MELLITUS (HCC): ICD-10-CM

## 2017-04-27 PROCEDURE — 73565 X-RAY EXAM OF KNEES: CPT

## 2017-04-27 PROCEDURE — 99214 OFFICE O/P EST MOD 30 MIN: CPT | Performed by: FAMILY MEDICINE

## 2017-04-27 NOTE — PATIENT INSTRUCTIONS
Arch supports for plantar fasciitis. Use the machine at the [employer]. Wear them in both shoes. In 1 month if not improving I can refer you for physical therapy. You did not seem very interested in an injection into the foot.       Plantar Fasciitis  Pl · Premade or custom-made night splints keep the heel stretched out while you sleep. They may prevent morning pain.   · Avoid activities that stress the feet: jogging, prolonged standing or walking, contact sports, etc.  · First thing in the morning and befo

## 2017-04-27 NOTE — PROGRESS NOTES
Here with pain in the left knee. She has had bilateral knee replacements. It has been suggested to her during her  care for left above-the-knee amputation because of infection setting in.   She did avoid the amputation but needed multiple surgeries to cor NODULE 2 SITE BILAT  2008    Comment Benign    TOTAL KNEE REPLACEMENT      Comment right    LAPAROSCOPIC CHOLECYSTECTOMY N/A 12/21/2016    Comment Procedure: LAPAROSCOPIC CHOLECYSTECTOMY;  Surgeon: Wendi Sahni MD;  Location: [address] [place] [address] Metoprolol-Hydrochlorothiazide 100-25 MG Oral Tab Take 1 tablet by mouth daily. Disp: 90 tablet Rfl: 3   Albuterol Sulfate (2.5 MG/3ML) 0.083% Inhalation Nebu Soln Take 3 mL (2.5 mg total) by nebulization every 4 (four) hours as needed for Wheezing.  Disp and no swelling. Right foot with normal pulses and no swelling. She is tender over the plantar fascia especially at its insertion into the calcaneus medially. No redness or warmth. Her arches are somewhat fallen.   We examined her in the standing positi

## 2017-04-28 ENCOUNTER — TELEPHONE (OUTPATIENT)
Dept: FAMILY MEDICINE CLINIC | Facility: CLINIC | Age: 72
End: 2017-04-28

## 2017-04-28 NOTE — TELEPHONE ENCOUNTER
Patient has been informed of 's response. She verbalizes understanding and will contact ortho for follow up. Task is done.

## 2017-04-28 NOTE — TELEPHONE ENCOUNTER
Pt awaiting XRay results of knee from 4/27/17. According to report:  CONCLUSION:  There are bilateral total knee arthroplasties. The hardware appears intact. The intramedullary edil component of the left femur and left tibia is intact. Diffuse osteopenia.

## 2017-04-28 NOTE — TELEPHONE ENCOUNTER
The hardware looks stable.  They see periosteal thickening the periosteum is the skin of the bone.  I am not sure if this is significant.  I would like you to follow-up with Dr. Jagruti Grya, the orthopedist who saw you previously, for further evaluation.

## 2017-05-06 RX ORDER — GABAPENTIN 800 MG/1
TABLET ORAL
Qty: 270 TABLET | Refills: 0 | Status: SHIPPED | OUTPATIENT
Start: 2017-05-06 | End: 2017-07-25

## 2017-05-09 ENCOUNTER — TELEPHONE (OUTPATIENT)
Dept: FAMILY MEDICINE CLINIC | Facility: CLINIC | Age: 72
End: 2017-05-09

## 2017-05-09 DIAGNOSIS — R92.1 CALCIFICATION OF LEFT BREAST: Primary | ICD-10-CM

## 2017-05-09 DIAGNOSIS — M25.462 SWOLLEN L KNEE: ICD-10-CM

## 2017-05-09 DIAGNOSIS — M25.461 SWOLLEN R KNEE: ICD-10-CM

## 2017-05-11 DIAGNOSIS — E11.39 UNCONTROLLED TYPE 2 DIABETES MELLITUS WITH OTHER OPHTHALMIC COMPLICATION: ICD-10-CM

## 2017-05-11 DIAGNOSIS — E11.65 UNCONTROLLED TYPE 2 DIABETES MELLITUS WITH OTHER OPHTHALMIC COMPLICATION: ICD-10-CM

## 2017-05-11 DIAGNOSIS — I10 ESSENTIAL HYPERTENSION: Primary | ICD-10-CM

## 2017-05-11 RX ORDER — VALSARTAN AND HYDROCHLOROTHIAZIDE 320; 25 MG/1; MG/1
1 TABLET, FILM COATED ORAL DAILY
Qty: 90 TABLET | Refills: 3 | Status: SHIPPED | OUTPATIENT
Start: 2017-05-11 | End: 2017-05-19

## 2017-05-12 RX ORDER — GLIMEPIRIDE 4 MG/1
TABLET ORAL
Qty: 180 TABLET | Refills: 0 | Status: SHIPPED | OUTPATIENT
Start: 2017-05-12 | End: 2017-09-12

## 2017-05-15 ENCOUNTER — HOSPITAL ENCOUNTER (OUTPATIENT)
Dept: MAMMOGRAPHY | Facility: HOSPITAL | Age: 72
Discharge: HOME | End: 2017-05-15
Attending: FAMILY MEDICINE
Payer: MEDICARE

## 2017-05-15 DIAGNOSIS — R92.1 CALCIFICATION OF LEFT BREAST: ICD-10-CM

## 2017-05-15 PROCEDURE — 77065 DX MAMMO INCL CAD UNI: CPT | Performed by: FAMILY MEDICINE

## 2017-05-19 ENCOUNTER — TELEPHONE (OUTPATIENT)
Dept: FAMILY MEDICINE CLINIC | Facility: CLINIC | Age: 72
End: 2017-05-19

## 2017-05-19 DIAGNOSIS — E11.65 UNCONTROLLED TYPE 2 DIABETES MELLITUS WITH OTHER OPHTHALMIC COMPLICATION: ICD-10-CM

## 2017-05-19 DIAGNOSIS — I10 ESSENTIAL HYPERTENSION: Primary | ICD-10-CM

## 2017-05-19 DIAGNOSIS — E11.39 UNCONTROLLED TYPE 2 DIABETES MELLITUS WITH OTHER OPHTHALMIC COMPLICATION: ICD-10-CM

## 2017-05-19 RX ORDER — VALSARTAN AND HYDROCHLOROTHIAZIDE 320; 25 MG/1; MG/1
1 TABLET, FILM COATED ORAL DAILY
Qty: 90 TABLET | Refills: 3 | Status: SHIPPED | OUTPATIENT
Start: 2017-05-19 | End: 2017-12-19

## 2017-05-23 ENCOUNTER — OFFICE VISIT (OUTPATIENT)
Dept: FAMILY MEDICINE CLINIC | Facility: CLINIC | Age: 72
End: 2017-05-23

## 2017-05-23 VITALS
WEIGHT: 232 LBS | HEART RATE: 78 BPM | DIASTOLIC BLOOD PRESSURE: 60 MMHG | OXYGEN SATURATION: 96 % | RESPIRATION RATE: 20 BRPM | BODY MASS INDEX: 37 KG/M2 | SYSTOLIC BLOOD PRESSURE: 120 MMHG

## 2017-05-23 DIAGNOSIS — E11.42 TYPE 2 DIABETES MELLITUS WITH DIABETIC POLYNEUROPATHY, WITHOUT LONG-TERM CURRENT USE OF INSULIN (HCC): Primary | ICD-10-CM

## 2017-05-23 DIAGNOSIS — M72.2 PLANTAR FASCIITIS OF RIGHT FOOT: ICD-10-CM

## 2017-05-23 DIAGNOSIS — M72.2 PLANTAR FASCIITIS OF LEFT FOOT: ICD-10-CM

## 2017-05-23 PROCEDURE — 99213 OFFICE O/P EST LOW 20 MIN: CPT | Performed by: FAMILY MEDICINE

## 2017-05-23 NOTE — PROGRESS NOTES
Here with frustration because her insurance is not covering a hinged brace recommended by Dr. Jonathan Jolley for her knee. The insurance responses that they flat out do not cover this type of brace. She wonders why she needs another mammogram in 6 months.   Earnest Li then several surgeries after that due to a fall & infection    NEEDLE BIOPSY LEFT      TOTAL KNEE REPLACEMENT      Comment right    LAPAROSCOPIC CHOLECYSTECTOMY N/A 12/21/2016    Comment Procedure: LAPAROSCOPIC CHOLECYSTECTOMY;  Surgeon: Curt Go MD; Disp: 270 tablet Rfl: 3   Metoprolol-Hydrochlorothiazide 100-25 MG Oral Tab Take 1 tablet by mouth daily.  Disp: 90 tablet Rfl: 3   Albuterol Sulfate (2.5 MG/3ML) 0.083% Inhalation Nebu Soln Take 3 mL (2.5 mg total) by nebulization every 4 (four) hours as n

## 2017-05-23 NOTE — PATIENT INSTRUCTIONS
Let me know if you need any more paperwork for diabetic shoes. Routine mammogram of both breasts in October. You had 6 month follow up of left this month. No abnormal findings.

## 2017-06-06 ENCOUNTER — MA CHART PREP (OUTPATIENT)
Dept: FAMILY MEDICINE CLINIC | Facility: CLINIC | Age: 72
End: 2017-06-06

## 2017-06-07 ENCOUNTER — OFFICE VISIT (OUTPATIENT)
Dept: FAMILY MEDICINE CLINIC | Facility: CLINIC | Age: 72
End: 2017-06-07

## 2017-06-07 VITALS
DIASTOLIC BLOOD PRESSURE: 80 MMHG | OXYGEN SATURATION: 99 % | BODY MASS INDEX: 38 KG/M2 | WEIGHT: 236 LBS | TEMPERATURE: 98 F | RESPIRATION RATE: 20 BRPM | HEART RATE: 73 BPM | SYSTOLIC BLOOD PRESSURE: 130 MMHG

## 2017-06-07 DIAGNOSIS — M81.0 AGE-RELATED OSTEOPOROSIS WITHOUT CURRENT PATHOLOGICAL FRACTURE: ICD-10-CM

## 2017-06-07 DIAGNOSIS — E11.42 DIABETIC POLYNEUROPATHY ASSOCIATED WITH TYPE 2 DIABETES MELLITUS (HCC): ICD-10-CM

## 2017-06-07 DIAGNOSIS — Z00.00 ENCOUNTER FOR ANNUAL HEALTH EXAMINATION: Primary | ICD-10-CM

## 2017-06-07 DIAGNOSIS — G43.909 MIGRAINE WITHOUT STATUS MIGRAINOSUS, NOT INTRACTABLE, UNSPECIFIED MIGRAINE TYPE: ICD-10-CM

## 2017-06-07 DIAGNOSIS — E78.00 PURE HYPERCHOLESTEROLEMIA: ICD-10-CM

## 2017-06-07 DIAGNOSIS — M19.041 PRIMARY OSTEOARTHRITIS OF RIGHT HAND: ICD-10-CM

## 2017-06-07 DIAGNOSIS — M19.042 PRIMARY OSTEOARTHRITIS OF LEFT HAND: ICD-10-CM

## 2017-06-07 DIAGNOSIS — E11.319 DIABETIC RETINOPATHY OF BOTH EYES ASSOCIATED WITH TYPE 2 DIABETES MELLITUS, MACULAR EDEMA PRESENCE UNSPECIFIED, UNSPECIFIED RETINOPATHY SEVERITY (HCC): ICD-10-CM

## 2017-06-07 DIAGNOSIS — Z96.652 STATUS POST REVISION OF TOTAL REPLACEMENT OF LEFT KNEE: ICD-10-CM

## 2017-06-07 DIAGNOSIS — I70.0 THORACIC AORTA ATHEROSCLEROSIS (HCC): ICD-10-CM

## 2017-06-07 DIAGNOSIS — I10 ESSENTIAL HYPERTENSION: ICD-10-CM

## 2017-06-07 DIAGNOSIS — E11.42 TYPE 2 DIABETES MELLITUS WITH DIABETIC POLYNEUROPATHY, WITHOUT LONG-TERM CURRENT USE OF INSULIN (HCC): ICD-10-CM

## 2017-06-07 DIAGNOSIS — H61.21 IMPACTED CERUMEN OF RIGHT EAR: ICD-10-CM

## 2017-06-07 DIAGNOSIS — N20.0 NEPHROLITHIASIS: ICD-10-CM

## 2017-06-07 DIAGNOSIS — E66.01 MORBID OBESITY WITH BMI OF 40.0-44.9, ADULT (HCC): ICD-10-CM

## 2017-06-07 DIAGNOSIS — H40.1190 PRIMARY OPEN ANGLE GLAUCOMA, UNSPECIFIED GLAUCOMA STAGE, UNSPECIFIED LATERALITY: ICD-10-CM

## 2017-06-07 DIAGNOSIS — I25.84 CORONARY ARTERY CALCIFICATION: ICD-10-CM

## 2017-06-07 DIAGNOSIS — Z12.11 SCREEN FOR COLON CANCER: ICD-10-CM

## 2017-06-07 DIAGNOSIS — J84.10 CALCIFIED GRANULOMA OF LUNG (HCC): ICD-10-CM

## 2017-06-07 DIAGNOSIS — N18.2 TYPE 2 DIABETES MELLITUS WITH STAGE 2 CHRONIC KIDNEY DISEASE, WITHOUT LONG-TERM CURRENT USE OF INSULIN (HCC): ICD-10-CM

## 2017-06-07 DIAGNOSIS — J44.9 ASTHMA WITH COPD (CHRONIC OBSTRUCTIVE PULMONARY DISEASE) (HCC): Chronic | ICD-10-CM

## 2017-06-07 DIAGNOSIS — E11.22 TYPE 2 DIABETES MELLITUS WITH STAGE 2 CHRONIC KIDNEY DISEASE, WITHOUT LONG-TERM CURRENT USE OF INSULIN (HCC): ICD-10-CM

## 2017-06-07 DIAGNOSIS — I25.10 CORONARY ARTERY CALCIFICATION: ICD-10-CM

## 2017-06-07 DIAGNOSIS — K21.9 GASTROESOPHAGEAL REFLUX DISEASE WITHOUT ESOPHAGITIS: ICD-10-CM

## 2017-06-07 DIAGNOSIS — M96.1 POSTLAMINECTOMY SYNDROME, LUMBAR REGION: ICD-10-CM

## 2017-06-07 PROBLEM — M72.2 PLANTAR FASCIITIS OF RIGHT FOOT: Status: RESOLVED | Noted: 2017-04-27 | Resolved: 2017-06-07

## 2017-06-07 PROBLEM — M85.839 OSTEOPENIA OF FOREARM: Status: RESOLVED | Noted: 2017-02-17 | Resolved: 2017-06-07

## 2017-06-07 PROCEDURE — 96160 PT-FOCUSED HLTH RISK ASSMT: CPT | Performed by: FAMILY MEDICINE

## 2017-06-07 NOTE — PATIENT INSTRUCTIONS
Samina Genao's SCREENING SCHEDULE   Tests on this list are recommended by your physician but may not be covered, or covered at this frequency, by your insurer. Please check with your insurance carrier before scheduling to verify coverage.    PREVENTATIV screening (once between ages 73-68) IPPE only No results found for this or any previous visit.  Limited to patients who meet one of the following criteria:   • Men who are 73-68 years old and have smoked more than 100 cigarettes in their lifetime   • Anyone Recommend Annually to at least age 76, and as needed after 76 Mammogram,1 Yr due on 05/15/2018 Please get this Mammogram regularly   Immunizations      Influenza  Covered Annually No orders found for this or any previous visit.  Please get every year    P Austrian)  [email or web address]  This link also has information from the [address] [place] [address] regarding Advance Directives.

## 2017-06-07 NOTE — PROGRESS NOTES
HPI:   Maxi Bryant is a 67year old female who presents for a MA (Medicare Advantage) [facility] (Once per calendar year). Continues to have pain in the lower back.   Her nerve stimulator was reprogrammed and is helping significantly with the left k labs)     Lab Results  Component Value Date   WBC 5.4 04/13/2017   HGB 12.0 04/13/2017   .0 04/13/2017        ALLERGIES:   She is allergic to adhesive tape; metformin; and statins.     CURRENT MEDICATIONS:     Outpatient Prescriptions Marked as Ramone Richards tablet (0.1 mg total) by mouth 2 (two) times daily. celecoxib (CELEBREX) 200 MG Oral Cap Take 1 capsule (200 mg total) by mouth daily.  Take one capsule by mouth every day   Nebulizer Does not apply Misc Nebulize with albuterol, 0.083%, 3 mL every 4-6 fara Visual Acuity: Corrected Left Eye Chart Acuity: 20/70   Both Eyes Visual Acuity: Corrected Both Eyes Chart Acuity: 20/50   Able To Tolerate Visual Acuity: Yes      General Appearance:  Alert, cooperative, no distress, appears stated age   Head:  Normocepha functions performed earlier this year. Type 2 diabetes mellitus with diabetic polyneuropathy, without long-term current use of insulin (HCC)  On glipizide and p.o. glitazone, taking Accu-Cheks regularly, not taking a statin due to rash.   Pure hypercholest time  Coronary artery calcification  Managed hyperlipidemia through diet and exercise. Controlled hypertension. Controlled diabetes.   Primary open angle glaucoma, unspecified glaucoma stage, unspecified laterality  -     Ophthalmology Referral - In [facility] help    Toileting: Able without help    Dressing: Able without help    Eating: Able without help    Driving: Able without help    Preparing your meals: Able without help    Managing money/bills: Able without help    Taking medications as prescribed: Able w External Lab or Procedure   Diabetes Screening      HbgA1C   Annually   Lab Results  Component Value Date   A1C 6.2* 02/15/2017    No flowsheet data found.     Fasting Blood Sugar (FSB)Annually   GLUCOSE (mg/dL)   Date Value   04/13/2017 135*   05/26/2015 1 10/27/2015 Please get once after your 65th birthday    Pneumococcal 23 (Pneumovax)  Covered Once after 65 No vaccine history found Please get once after your 65th birthday    Hepatitis B for Moderate/High Risk No vaccine history found Medium/high risk fact CHOLESTROL (mg/dL)   Date Value   05/15/2014 134*    No flowsheet data found. Dilated Eye exam  Annually Data entered on: 1/19/2016   Last Dilated Eye Exam 1/19/2016     No flowsheet data found.     COPD      Spirometry Testing Annually Spirometry date:

## 2017-06-08 ENCOUNTER — TELEPHONE (OUTPATIENT)
Dept: FAMILY MEDICINE CLINIC | Facility: CLINIC | Age: 72
End: 2017-06-08

## 2017-06-08 DIAGNOSIS — E11.69 TYPE 2 DIABETES MELLITUS WITH OTHER SPECIFIED COMPLICATION (HCC): Primary | ICD-10-CM

## 2017-06-08 NOTE — TELEPHONE ENCOUNTER
In notes:    MEDICAL INFORMATION:    She  has a past medical history of Type II or unspecified type diabetes mellitus without mention of complication, not stated as uncontrolled

## 2017-06-08 NOTE — TELEPHONE ENCOUNTER
Patient called central referral line, she is needing referral to  prostatic for Diabetic shoes.        Outgoing/external  Diagnosis:  E11.9  2 visits

## 2017-06-12 ENCOUNTER — TELEPHONE (OUTPATIENT)
Dept: FAMILY MEDICINE CLINIC | Facility: CLINIC | Age: 72
End: 2017-06-12

## 2017-06-13 ENCOUNTER — OFFICE VISIT (OUTPATIENT)
Dept: FAMILY MEDICINE CLINIC | Facility: CLINIC | Age: 72
End: 2017-06-13

## 2017-06-13 VITALS
SYSTOLIC BLOOD PRESSURE: 160 MMHG | WEIGHT: 236 LBS | OXYGEN SATURATION: 98 % | DIASTOLIC BLOOD PRESSURE: 80 MMHG | RESPIRATION RATE: 20 BRPM | HEART RATE: 77 BPM | TEMPERATURE: 99 F | BODY MASS INDEX: 38 KG/M2

## 2017-06-13 DIAGNOSIS — E11.8 DIABETIC FOOT (HCC): ICD-10-CM

## 2017-06-13 DIAGNOSIS — H61.21 HEARING LOSS DUE TO CERUMEN IMPACTION, RIGHT: Primary | ICD-10-CM

## 2017-06-13 PROCEDURE — 99212 OFFICE O/P EST SF 10 MIN: CPT | Performed by: FAMILY MEDICINE

## 2017-06-13 PROCEDURE — 69210 REMOVE IMPACTED EAR WAX UNI: CPT | Performed by: FAMILY MEDICINE

## 2017-06-13 NOTE — PROGRESS NOTES
Here for follow-up of cerumen seen last week on her exam.  Left canal was cleared but right canal cannot be cleared. She has been using wax softening drops. She brings with her a form for diabetic shoes.   She has diabetic neuropathy but does not have c

## 2017-06-14 ENCOUNTER — TELEPHONE (OUTPATIENT)
Dept: FAMILY MEDICINE CLINIC | Facility: CLINIC | Age: 72
End: 2017-06-14

## 2017-06-14 DIAGNOSIS — E11.49 OTHER DIABETIC NEUROLOGICAL COMPLICATION ASSOCIATED WITH TYPE 2 DIABETES MELLITUS (HCC): Primary | ICD-10-CM

## 2017-06-14 NOTE — TELEPHONE ENCOUNTER
Entered Referral, gave pt contact information and informed pt the form was faxed yesterday. Task completed.

## 2017-06-20 ENCOUNTER — TELEPHONE (OUTPATIENT)
Dept: FAMILY MEDICINE CLINIC | Facility: CLINIC | Age: 72
End: 2017-06-20

## 2017-06-26 RX ORDER — METOPROLOL TARTRATE AND HYDROCHLOROTHIAZIDE 100; 25 MG/1; MG/1
TABLET ORAL
Qty: 90 TABLET | Refills: 0 | Status: SHIPPED | OUTPATIENT
Start: 2017-06-26 | End: 2017-09-10

## 2017-06-26 RX ORDER — OMEPRAZOLE 40 MG/1
CAPSULE, DELAYED RELEASE ORAL
Qty: 90 CAPSULE | Refills: 0 | Status: SHIPPED | OUTPATIENT
Start: 2017-06-26 | End: 2017-09-10

## 2017-06-28 ENCOUNTER — TELEPHONE (OUTPATIENT)
Dept: FAMILY MEDICINE CLINIC | Facility: CLINIC | Age: 72
End: 2017-06-28

## 2017-06-28 NOTE — TELEPHONE ENCOUNTER
Patient has requested that referral to [address] [place] [address] be re-faxed to [phone]. She has appointment with them @ 2:30 tomorrow. Agreed to do so. Task is done.

## 2017-06-30 ENCOUNTER — TELEPHONE (OUTPATIENT)
Dept: FAMILY MEDICINE CLINIC | Facility: CLINIC | Age: 72
End: 2017-06-30

## 2017-06-30 RX ORDER — CLOPIDOGREL BISULFATE 75 MG/1
TABLET ORAL
Qty: 90 TABLET | Refills: 0 | Status: SHIPPED | OUTPATIENT
Start: 2017-06-30 | End: 2017-09-10

## 2017-06-30 RX ORDER — PIOGLITAZONEHYDROCHLORIDE 30 MG/1
TABLET ORAL
Qty: 90 TABLET | Refills: 0 | Status: SHIPPED | OUTPATIENT
Start: 2017-06-30 | End: 2017-10-05

## 2017-07-14 ENCOUNTER — TELEPHONE (OUTPATIENT)
Dept: FAMILY MEDICINE CLINIC | Facility: CLINIC | Age: 72
End: 2017-07-14

## 2017-07-14 NOTE — TELEPHONE ENCOUNTER
Patient c/o yeast infection- discharge, itching. Patient instructed to use Monistat cream. Will call Monday if no relief.

## 2017-07-25 ENCOUNTER — HOSPITAL ENCOUNTER (OUTPATIENT)
Dept: GENERAL RADIOLOGY | Age: 72
Discharge: HOME | End: 2017-07-25
Attending: INTERNAL MEDICINE
Payer: MEDICARE

## 2017-07-25 ENCOUNTER — OFFICE VISIT (OUTPATIENT)
Dept: FAMILY MEDICINE CLINIC | Facility: CLINIC | Age: 72
End: 2017-07-25

## 2017-07-25 VITALS
BODY MASS INDEX: 36.96 KG/M2 | DIASTOLIC BLOOD PRESSURE: 84 MMHG | HEART RATE: 84 BPM | HEIGHT: 66 IN | SYSTOLIC BLOOD PRESSURE: 138 MMHG | TEMPERATURE: 99 F | OXYGEN SATURATION: 96 % | RESPIRATION RATE: 20 BRPM | WEIGHT: 230 LBS
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
## 2017-07-25 DIAGNOSIS — M54.50 ACUTE EXACERBATION OF CHRONIC LOW BACK PAIN: ICD-10-CM

## 2017-07-25 DIAGNOSIS — G89.29 ACUTE EXACERBATION OF CHRONIC LOW BACK PAIN: ICD-10-CM

## 2017-07-25 DIAGNOSIS — R30.0 DYSURIA: Primary | ICD-10-CM

## 2017-07-25 LAB
APPEARANCE: CLEAR
MULTISTIX LOT#: NORMAL NUMERIC
PH, URINE: 5.5 (ref 4.5–8)
SPECIFIC GRAVITY: 1.02 (ref 1–1.03)
URINE-COLOR: YELLOW

## 2017-07-25 PROCEDURE — 87086 URINE CULTURE/COLONY COUNT: CPT | Performed by: INTERNAL MEDICINE

## 2017-07-25 PROCEDURE — 72072 X-RAY EXAM THORAC SPINE 3VWS: CPT | Performed by: INTERNAL MEDICINE

## 2017-07-25 PROCEDURE — 99214 OFFICE O/P EST MOD 30 MIN: CPT | Performed by: INTERNAL MEDICINE

## 2017-07-25 PROCEDURE — 81003 URINALYSIS AUTO W/O SCOPE: CPT | Performed by: INTERNAL MEDICINE

## 2017-07-25 PROCEDURE — 72110 X-RAY EXAM L-2 SPINE 4/>VWS: CPT | Performed by: INTERNAL MEDICINE

## 2017-07-25 RX ORDER — GABAPENTIN 800 MG/1
TABLET ORAL
Qty: 270 TABLET | Refills: 0 | Status: SHIPPED | OUTPATIENT
Start: 2017-07-25 | End: 2017-10-05

## 2017-07-25 RX ORDER — GABAPENTIN 800 MG/1
TABLET ORAL
Qty: 270 TABLET | Refills: 0 | Status: SHIPPED | OUTPATIENT
Start: 2017-07-25 | End: 2017-07-25

## 2017-07-25 RX ORDER — HYDROCODONE BITARTRATE AND ACETAMINOPHEN 10; 325 MG/1; MG/1
TABLET ORAL
COMMUNITY
Start: 2017-06-18 | End: 2017-08-07

## 2017-07-25 RX ORDER — NITROFURANTOIN 25; 75 MG/1; MG/1
100 CAPSULE ORAL 2 TIMES DAILY
Qty: 14 CAPSULE | Refills: 0 | Status: SHIPPED | OUTPATIENT
Start: 2017-07-25 | End: 2017-08-01

## 2017-07-25 NOTE — PROGRESS NOTES
Domonique Watson is a 67year old female. HPI:   Here for pelvic pressure and burning with urination. Denies vaginal discharge. Reports having a bladder infection before and this feels the same. No fever. No blood in urine.  Reports a history of kidney ston Oral Capsule SR 24 Hr Take 1 capsule (150 mg total) by mouth daily. Disp: 90 capsule Rfl: 0   SUMAtriptan Succinate 50 MG Oral Tab Take 1 tablet (50 mg total) by mouth every 2 (two) hours as needed for Migraine.  Disp: 9 tablet Rfl: 12   topiramate 100 MG O Pet scan: 11/15/2015  Benign granulomas   • Status post revision of total replacement of left knee 7/11/2014   • Type II or unspecified type diabetes mellitus without mention of complication, not stated as uncontrolled    • Unspecified essential hypertensi Nitrofurantoin Monohyd Macro (MACROBID) 100 MG Oral Cap      gabapentin 800 MG Oral Tab    The patient indicates understanding of these issues and agrees to the plan. Follow up Friday- pt has an appt with Dr. Meghan Dunn.

## 2017-07-28 ENCOUNTER — OFFICE VISIT (OUTPATIENT)
Dept: FAMILY MEDICINE CLINIC | Facility: CLINIC | Age: 72
End: 2017-07-28

## 2017-07-28 VITALS
TEMPERATURE: 98 F | DIASTOLIC BLOOD PRESSURE: 80 MMHG | HEART RATE: 73 BPM | RESPIRATION RATE: 20 BRPM | OXYGEN SATURATION: 96 % | SYSTOLIC BLOOD PRESSURE: 140 MMHG

## 2017-07-28 DIAGNOSIS — M96.1 POSTLAMINECTOMY SYNDROME, LUMBAR REGION: ICD-10-CM

## 2017-07-28 DIAGNOSIS — S22.000A COMPRESSION FRACTURE OF BODY OF THORACIC VERTEBRA (HCC): ICD-10-CM

## 2017-07-28 DIAGNOSIS — S09.90XA HEAD TRAUMA, INITIAL ENCOUNTER: ICD-10-CM

## 2017-07-28 DIAGNOSIS — H51.8 DYSCONJUGATE GAZE: ICD-10-CM

## 2017-07-28 DIAGNOSIS — S22.000A COMPRESSION FRACTURE OF BODY OF THORACIC VERTEBRA (HCC): Primary | ICD-10-CM

## 2017-07-28 PROCEDURE — 99214 OFFICE O/P EST MOD 30 MIN: CPT | Performed by: FAMILY MEDICINE

## 2017-07-28 RX ORDER — HYDROCODONE BITARTRATE AND ACETAMINOPHEN 10; 325 MG/1; MG/1
1 TABLET ORAL EVERY 8 HOURS PRN
Qty: 90 TABLET | Refills: 0 | Status: SHIPPED | COMMUNITY
Start: 2017-07-28 | End: 2017-08-29

## 2017-07-28 RX ORDER — CALCITONIN SALMON 200 [IU]/.09ML
1 SPRAY, METERED NASAL DAILY
Qty: 3.7 ML | Refills: 1 | Status: SHIPPED | OUTPATIENT
Start: 2017-07-28 | End: 2017-07-28

## 2017-07-28 RX ORDER — CALCITONIN SALMON 200 [IU]/.09ML
SPRAY, METERED NASAL
Qty: 11.1 ML | Refills: 1 | Status: SHIPPED | OUTPATIENT
Start: 2017-07-28 | End: 2017-08-29

## 2017-07-28 NOTE — PROGRESS NOTES
Here for pain in the eye. She took a fall while in [place] visiting her family members [facility]. She did have head trauma. She had pain in the left eye and swelling of the left face. She did not see anyone acutely there.   Upon her return to [facility] SITE BILAT      Comment: Benign/BENIGN  No date: NEEDLE BIOPSY LEFT  No date: OTHER SURGICAL HISTORY      Comment: stimulator,spine x 2  No date: OTHER SURGICAL HISTORY      Comment: right heal spur  No date: OTHER SURGICAL HISTORY      Comment: bilateral total) by mouth every 2 (two) hours as needed for Migraine. Disp: 9 tablet Rfl: 12   topiramate 100 MG Oral Tab Take 1 tablet (100 mg total) by mouth 2 (two) times daily.  Migraine prevention Disp: 180 tablet Rfl: 1   ACCU-CHEK SOFTCLIX LANCETS Does not jose luis redness swelling or bruising. Mild swelling of the left face. Neck is supple. Thoracic spine is nontender.     Assessment #1 head trauma with fall #2 abnormal gaze left eye higher than right #3 compression fractures thoracic 11 and 12 #4 chronic low back

## 2017-08-03 ENCOUNTER — HOSPITAL ENCOUNTER (OUTPATIENT)
Dept: CT IMAGING | Age: 72
Discharge: HOME | End: 2017-08-03
Attending: FAMILY MEDICINE
Payer: MEDICARE

## 2017-08-03 DIAGNOSIS — S09.90XA HEAD TRAUMA, INITIAL ENCOUNTER: ICD-10-CM

## 2017-08-03 DIAGNOSIS — H51.8 DYSCONJUGATE GAZE: ICD-10-CM

## 2017-08-03 PROBLEM — S05.91XA: Status: ACTIVE | Noted: 2017-08-03

## 2017-08-03 PROCEDURE — 76377 3D RENDER W/INTRP POSTPROCES: CPT

## 2017-08-03 PROCEDURE — 70486 CT MAXILLOFACIAL W/O DYE: CPT | Performed by: FAMILY MEDICINE

## 2017-08-07 ENCOUNTER — OFFICE VISIT (OUTPATIENT)
Dept: FAMILY MEDICINE CLINIC | Facility: CLINIC | Age: 72
End: 2017-08-07

## 2017-08-07 VITALS
HEIGHT: 66 IN | HEART RATE: 86 BPM | DIASTOLIC BLOOD PRESSURE: 86 MMHG | WEIGHT: 231 LBS | BODY MASS INDEX: 37.12 KG/M2 | RESPIRATION RATE: 18 BRPM | SYSTOLIC BLOOD PRESSURE: 140 MMHG

## 2017-08-07 DIAGNOSIS — S05.91XA: ICD-10-CM

## 2017-08-07 DIAGNOSIS — S22.000A COMPRESSION FRACTURE OF BODY OF THORACIC VERTEBRA (HCC): Primary | ICD-10-CM

## 2017-08-07 DIAGNOSIS — M96.1 POSTLAMINECTOMY SYNDROME, LUMBAR REGION: ICD-10-CM

## 2017-08-07 PROCEDURE — 99213 OFFICE O/P EST LOW 20 MIN: CPT | Performed by: FAMILY MEDICINE

## 2017-08-07 NOTE — PROGRESS NOTES
Here to follow-up on CT scan of the facial bones. For some time she has had difficulty with double vision. She had head trauma while visiting in [place]. Office visit approximately 10-14 days later. She has complaints of pain in the left eye.   She has re

## 2017-08-08 ENCOUNTER — TELEPHONE (OUTPATIENT)
Dept: FAMILY MEDICINE CLINIC | Facility: CLINIC | Age: 72
End: 2017-08-08

## 2017-08-08 DIAGNOSIS — S09.93XA FACIAL TRAUMA, INITIAL ENCOUNTER: Primary | ICD-10-CM

## 2017-08-08 NOTE — TELEPHONE ENCOUNTER
Dr. Karine Jacobson,  Please see message from Dr. Garima Gutierrez.  Should we refer to Dr. Sreedhar Mata

## 2017-08-08 NOTE — TELEPHONE ENCOUNTER
Shree Nieves stated that patient wants diabetic shoes and inserts. There is an issue with the diagnosis and paperwork that may need to be re-done. Please call her and she will go over it with you. Will be faxing another form over incase we need it.

## 2017-08-08 NOTE — TELEPHONE ENCOUNTER
Chela from [address] called stated khadar Crain can't do anything for the pt and does not feel comfortable treating pt, he is suggesting for pt to maybe go see khadar Ruiz. Please call Chela if you have any questions regarding her message.

## 2017-08-08 NOTE — TELEPHONE ENCOUNTER
Patient called and given name and # to Dr. James Mendoza at [address] [place] [address]- #[phone]. Maxillofacial surgeon.

## 2017-08-15 ENCOUNTER — TELEPHONE (OUTPATIENT)
Dept: FAMILY MEDICINE CLINIC | Facility: CLINIC | Age: 72
End: 2017-08-15

## 2017-08-15 NOTE — TELEPHONE ENCOUNTER
Office note from 8/7/17 states \"Plans referral desk is working on a follow-up for I.  I discussed with her injections for the back. She is not interested in any more needles or surgery. She is on maximum medications. \"  Do you want to refer pt for injec

## 2017-08-15 NOTE — TELEPHONE ENCOUNTER
Patient called, she states when she was last in to see Dr Bhavin Coy, because of her fracture in her back, he would send her for an injection. She declined at that time. She is now requesting to have this done. She is having a lot of pain.   However, sh

## 2017-08-15 NOTE — TELEPHONE ENCOUNTER
Pt given info on Dr. Jarrod Mendez. [address] [facility], [address]    Phone: [phone]   she will f/u with him regarding pain.

## 2017-08-23 PROBLEM — Z96.89 S/P INSERTION OF SPINAL CORD STIMULATOR: Status: ACTIVE | Noted: 2017-08-23

## 2017-08-23 PROBLEM — Z98.1 S/P LUMBAR FUSION: Status: ACTIVE | Noted: 2017-08-23

## 2017-08-23 PROBLEM — S22.000A COMPRESSION FRACTURE OF THORACIC VERTEBRA, CLOSED, INITIAL ENCOUNTER (HCC): Status: ACTIVE | Noted: 2017-07-28

## 2017-08-24 ENCOUNTER — TELEPHONE (OUTPATIENT)
Dept: FAMILY MEDICINE CLINIC | Facility: CLINIC | Age: 72
End: 2017-08-24

## 2017-08-24 NOTE — TELEPHONE ENCOUNTER
Pt would like to be seen for back pain. Offered appt with Devante Slater, pt would rather see Dr. Amari Palm. Appt scheduled with Dr. Amari Palm on Tuesday.

## 2017-08-29 ENCOUNTER — OFFICE VISIT (OUTPATIENT)
Dept: FAMILY MEDICINE CLINIC | Facility: CLINIC | Age: 72
End: 2017-08-29

## 2017-08-29 VITALS
HEIGHT: 66 IN | DIASTOLIC BLOOD PRESSURE: 60 MMHG | SYSTOLIC BLOOD PRESSURE: 120 MMHG | WEIGHT: 235 LBS | OXYGEN SATURATION: 98 % | HEART RATE: 70 BPM | RESPIRATION RATE: 18 BRPM | TEMPERATURE: 98 F | BODY MASS INDEX: 37.77 KG/M2

## 2017-08-29 DIAGNOSIS — M96.1 POSTLAMINECTOMY SYNDROME, LUMBAR REGION: Primary | ICD-10-CM

## 2017-08-29 DIAGNOSIS — S22.000A COMPRESSION FRACTURE OF BODY OF THORACIC VERTEBRA (HCC): ICD-10-CM

## 2017-08-29 DIAGNOSIS — R91.1 LUNG NODULE < 6CM ON CT: ICD-10-CM

## 2017-08-29 PROCEDURE — 99213 OFFICE O/P EST LOW 20 MIN: CPT | Performed by: FAMILY MEDICINE

## 2017-08-29 RX ORDER — HYDROCODONE BITARTRATE AND ACETAMINOPHEN 10; 325 MG/1; MG/1
1 TABLET ORAL EVERY 8 HOURS PRN
Qty: 90 TABLET | Refills: 0 | Status: SHIPPED | COMMUNITY
Start: 2017-08-29 | End: 2017-09-28

## 2017-08-29 RX ORDER — HYDROCODONE BITARTRATE AND ACETAMINOPHEN 10; 325 MG/1; MG/1
1 TABLET ORAL EVERY 8 HOURS PRN
Qty: 90 TABLET | Refills: 0 | Status: SHIPPED | COMMUNITY
Start: 2017-10-28 | End: 2017-11-27

## 2017-08-29 RX ORDER — HYDROCODONE BITARTRATE AND ACETAMINOPHEN 10; 325 MG/1; MG/1
1 TABLET ORAL EVERY 8 HOURS PRN
Qty: 90 TABLET | Refills: 0 | Status: SHIPPED | COMMUNITY
Start: 2017-09-28 | End: 2017-10-28

## 2017-08-29 RX ORDER — CALCITONIN SALMON 200 [IU]/.09ML
SPRAY, METERED NASAL
Qty: 11.1 ML | Refills: 1 | Status: SHIPPED | OUTPATIENT
Start: 2017-08-29 | End: 2020-07-24 | Stop reason: ALTCHOICE

## 2017-08-29 NOTE — PROGRESS NOTES
Elizabeth Fierro will be moving to [place] October 1. She has a number of orders from Dr. Luis Felipe Seugra to have performed for her back including a CT of the thoracic spine, a CT of the lumbar spine, and a nuclear medicine three-phase bone scan.   Her pain from her compressi CHOLECYSTECTOMY;                 Surgeon: Curt Go MD;  Location: [facility] MAIN                OR  2008 11/16': AKI BIOPSY STEREOTACTIC NODULE 2 SITE BILAT      Comment: Benign/BENIGN  No date: NEEDLE BIOPSY LEFT  No date: OTHER SURGICAL HISTORY      Comm BRIANNA PLUS In Vitro Strip TEST TWICE A DAY OR USE AS DIRECTED. Disp: 200 strip Rfl: 1   Venlafaxine HCl  MG Oral Capsule SR 24 Hr Take 1 capsule (150 mg total) by mouth daily.  Disp: 90 capsule Rfl: 0   SUMAtriptan Succinate 50 MG Oral Tab Take 1 tabl thoracic spine fractures T11 and T12. #2 status post laminectomy syndrome #3 lung nodule    Plans I agreed with her waiting until she moves to have that workup completed. I have refilled a Norco for 3 months.   I gave her recommendations for the CT of her

## 2017-08-29 NOTE — PATIENT INSTRUCTIONS
Mammogram leading to biopsy in November 2016. Benign fibroadenomas change with calcification. Had repeat mammogram left breast in May 2017 that was normal.  May resume annual testing    Compression fractures in thoracic vertebra 11 and 12.   Specialist re

## 2017-09-10 DIAGNOSIS — M96.1 POSTLAMINECTOMY SYNDROME, LUMBAR REGION: ICD-10-CM

## 2017-09-11 RX ORDER — VENLAFAXINE HYDROCHLORIDE 150 MG/1
CAPSULE, EXTENDED RELEASE ORAL
Qty: 90 CAPSULE | Refills: 0 | Status: SHIPPED | OUTPATIENT
Start: 2017-09-11 | End: 2017-12-14

## 2017-09-11 RX ORDER — CLOPIDOGREL BISULFATE 75 MG/1
TABLET ORAL
Qty: 90 TABLET | Refills: 0 | Status: SHIPPED | OUTPATIENT
Start: 2017-09-11 | End: 2017-12-18

## 2017-09-11 RX ORDER — METOPROLOL TARTRATE AND HYDROCHLOROTHIAZIDE 100; 25 MG/1; MG/1
TABLET ORAL
Qty: 90 TABLET | Refills: 0 | Status: SHIPPED | OUTPATIENT
Start: 2017-09-11 | End: 2017-12-18

## 2017-09-11 RX ORDER — CYCLOBENZAPRINE HCL 10 MG
TABLET ORAL
Qty: 270 TABLET | Refills: 0 | Status: SHIPPED | OUTPATIENT
Start: 2017-09-11 | End: 2020-06-10

## 2017-09-13 RX ORDER — BLOOD SUGAR DIAGNOSTIC
STRIP MISCELLANEOUS
Qty: 200 STRIP | Refills: 1 | Status: SHIPPED | OUTPATIENT
Start: 2017-09-13 | End: 2020-05-08

## 2017-09-13 RX ORDER — OMEPRAZOLE 40 MG/1
CAPSULE, DELAYED RELEASE ORAL
Qty: 90 CAPSULE | Refills: 0 | Status: SHIPPED | OUTPATIENT
Start: 2017-09-13 | End: 2017-12-14

## 2017-09-13 RX ORDER — CELECOXIB 200 MG/1
200 CAPSULE ORAL DAILY
Qty: 90 CAPSULE | Refills: 3 | Status: SHIPPED | OUTPATIENT
Start: 2017-09-13 | End: 2020-03-03 | Stop reason: ALTCHOICE

## 2017-09-13 RX ORDER — GLIMEPIRIDE 4 MG/1
TABLET ORAL
Qty: 180 TABLET | Refills: 0 | Status: SHIPPED | OUTPATIENT
Start: 2017-09-13 | End: 2017-12-18

## 2017-10-05 DIAGNOSIS — M54.50 ACUTE EXACERBATION OF CHRONIC LOW BACK PAIN: ICD-10-CM

## 2017-10-05 DIAGNOSIS — G89.29 ACUTE EXACERBATION OF CHRONIC LOW BACK PAIN: ICD-10-CM

## 2017-10-05 RX ORDER — GABAPENTIN 800 MG/1
TABLET ORAL
Qty: 270 TABLET | Refills: 0 | Status: SHIPPED | OUTPATIENT
Start: 2017-10-05 | End: 2018-05-06

## 2017-10-06 RX ORDER — PIOGLITAZONEHYDROCHLORIDE 30 MG/1
TABLET ORAL
Qty: 90 TABLET | Refills: 0 | Status: SHIPPED | OUTPATIENT
Start: 2017-10-06 | End: 2020-03-03 | Stop reason: ALTCHOICE

## 2017-12-14 RX ORDER — VENLAFAXINE HYDROCHLORIDE 150 MG/1
CAPSULE, EXTENDED RELEASE ORAL
Qty: 90 CAPSULE | Refills: 0 | Status: SHIPPED | OUTPATIENT
Start: 2017-12-14 | End: 2020-03-03 | Stop reason: ALTCHOICE

## 2017-12-15 RX ORDER — OMEPRAZOLE 40 MG/1
CAPSULE, DELAYED RELEASE ORAL
Qty: 90 CAPSULE | Refills: 0 | Status: SHIPPED | OUTPATIENT
Start: 2017-12-15 | End: 2020-03-03 | Stop reason: ALTCHOICE

## 2017-12-19 RX ORDER — GLIMEPIRIDE 4 MG/1
TABLET ORAL
Qty: 180 TABLET | Refills: 0 | OUTPATIENT
Start: 2017-12-19

## 2017-12-19 RX ORDER — GLIMEPIRIDE 4 MG/1
TABLET ORAL
Qty: 180 TABLET | Refills: 0 | Status: SHIPPED | OUTPATIENT
Start: 2017-12-19 | End: 2020-03-03

## 2017-12-19 RX ORDER — METOPROLOL TARTRATE AND HYDROCHLOROTHIAZIDE 100; 25 MG/1; MG/1
TABLET ORAL
Qty: 90 TABLET | Refills: 0 | Status: SHIPPED | OUTPATIENT
Start: 2017-12-19 | End: 2017-12-19

## 2017-12-19 RX ORDER — CLOPIDOGREL BISULFATE 75 MG/1
TABLET ORAL
Qty: 90 TABLET | Refills: 0 | Status: SHIPPED | OUTPATIENT
Start: 2017-12-19 | End: 2018-02-15

## 2017-12-19 RX ORDER — METOPROLOL TARTRATE AND HYDROCHLOROTHIAZIDE 100; 25 MG/1; MG/1
TABLET ORAL
Qty: 90 TABLET | Refills: 0 | Status: SHIPPED | OUTPATIENT
Start: 2017-12-19 | End: 2020-03-03 | Stop reason: ALTCHOICE

## 2018-02-15 RX ORDER — CLOPIDOGREL BISULFATE 75 MG/1
75 TABLET ORAL
Qty: 90 TABLET | Refills: 0 | Status: SHIPPED | OUTPATIENT
Start: 2018-02-15 | End: 2020-03-03 | Stop reason: ALTCHOICE

## 2018-02-28 PROBLEM — E11.3219: Status: ACTIVE | Noted: 2018-02-28

## 2018-04-26 ENCOUNTER — TELEPHONE (OUTPATIENT)
Dept: FAMILY MEDICINE CLINIC | Facility: CLINIC | Age: 73
End: 2018-04-26

## 2018-04-26 NOTE — TELEPHONE ENCOUNTER
Received fax for release of medical records for a CT Scan Spine. There were no CT Scan of Spine ordered by Dr. Ravi Bran. Faxed office to let them know.

## 2018-05-02 ENCOUNTER — PATIENT OUTREACH (OUTPATIENT)
Dept: FAMILY MEDICINE CLINIC | Facility: CLINIC | Age: 73
End: 2018-05-02

## 2018-05-02 RX ORDER — GABAPENTIN 800 MG/1
TABLET ORAL
Qty: 270 TABLET | Refills: 0 | OUTPATIENT
Start: 2018-05-02

## 2018-05-06 RX ORDER — GABAPENTIN 800 MG/1
TABLET ORAL
Qty: 270 TABLET | Refills: 0 | Status: SHIPPED | OUTPATIENT
Start: 2018-05-06 | End: 2018-08-18

## 2018-08-20 RX ORDER — GABAPENTIN 800 MG/1
TABLET ORAL
Qty: 270 TABLET | Refills: 0 | Status: SHIPPED | OUTPATIENT
Start: 2018-08-20

## 2018-10-31 ENCOUNTER — PATIENT OUTREACH (OUTPATIENT)
Dept: FAMILY MEDICINE CLINIC | Facility: CLINIC | Age: 73
End: 2018-10-31

## 2019-01-03 ENCOUNTER — PATIENT OUTREACH (OUTPATIENT)
Dept: FAMILY MEDICINE CLINIC | Facility: CLINIC | Age: 74
End: 2019-01-03

## 2019-02-18 PROBLEM — M16.11 OSTEOARTHRITIS OF RIGHT HIP, UNSPECIFIED OSTEOARTHRITIS TYPE: Status: ACTIVE | Noted: 2019-02-18

## 2019-02-18 PROBLEM — M25.551 RIGHT HIP PAIN: Status: ACTIVE | Noted: 2019-02-18

## 2019-02-21 ENCOUNTER — TELEPHONE (OUTPATIENT)
Dept: FAMILY MEDICINE CLINIC | Facility: CLINIC | Age: 74
End: 2019-02-21

## 2019-02-21 NOTE — TELEPHONE ENCOUNTER
Fax received from [facility] pain management, clearance needed for pt to get hip injections. Per RC, pt has not been seen at our office in 18 months, needs OV. Pt is currently residing in [facility].   Spoke to the nurse at [facility], she stat

## 2019-03-11 PROBLEM — M16.11 PRIMARY OSTEOARTHRITIS OF RIGHT HIP: Status: ACTIVE | Noted: 2019-02-18

## 2019-10-11 PROBLEM — J84.10 CALCIFIED GRANULOMA OF LUNG (HCC): Status: RESOLVED | Noted: 2017-04-01 | Resolved: 2019-10-11

## 2020-03-03 ENCOUNTER — OFFICE VISIT (OUTPATIENT)
Dept: FAMILY MEDICINE CLINIC | Facility: CLINIC | Age: 75
End: 2020-03-03
Payer: MEDICARE

## 2020-03-03 VITALS
SYSTOLIC BLOOD PRESSURE: 140 MMHG | OXYGEN SATURATION: 98 % | BODY MASS INDEX: 46.28 KG/M2 | HEIGHT: 66 IN | WEIGHT: 288 LBS | DIASTOLIC BLOOD PRESSURE: 60 MMHG | HEART RATE: 68 BPM | RESPIRATION RATE: 18 BRPM

## 2020-03-03 DIAGNOSIS — J44.9 ASTHMA WITH COPD (CHRONIC OBSTRUCTIVE PULMONARY DISEASE) (HCC): Primary | Chronic | ICD-10-CM

## 2020-03-03 DIAGNOSIS — E11.22 TYPE 2 DIABETES MELLITUS WITH STAGE 2 CHRONIC KIDNEY DISEASE, WITHOUT LONG-TERM CURRENT USE OF INSULIN (HCC): ICD-10-CM

## 2020-03-03 DIAGNOSIS — M21.70 LEG LENGTH DIFFERENCE, ACQUIRED: ICD-10-CM

## 2020-03-03 DIAGNOSIS — N18.2 TYPE 2 DIABETES MELLITUS WITH STAGE 2 CHRONIC KIDNEY DISEASE, WITHOUT LONG-TERM CURRENT USE OF INSULIN (HCC): ICD-10-CM

## 2020-03-03 DIAGNOSIS — M16.11 PRIMARY OSTEOARTHRITIS OF RIGHT HIP: ICD-10-CM

## 2020-03-03 DIAGNOSIS — E11.319 DIABETIC RETINOPATHY OF BOTH EYES ASSOCIATED WITH TYPE 2 DIABETES MELLITUS, MACULAR EDEMA PRESENCE UNSPECIFIED, UNSPECIFIED RETINOPATHY SEVERITY (HCC): ICD-10-CM

## 2020-03-03 DIAGNOSIS — M96.1 POSTLAMINECTOMY SYNDROME, LUMBAR REGION: ICD-10-CM

## 2020-03-03 DIAGNOSIS — H40.1190 PRIMARY OPEN ANGLE GLAUCOMA, UNSPECIFIED GLAUCOMA STAGE, UNSPECIFIED LATERALITY: ICD-10-CM

## 2020-03-03 DIAGNOSIS — I25.10 CORONARY ARTERY CALCIFICATION: ICD-10-CM

## 2020-03-03 DIAGNOSIS — M81.0 AGE-RELATED OSTEOPOROSIS WITHOUT CURRENT PATHOLOGICAL FRACTURE: ICD-10-CM

## 2020-03-03 DIAGNOSIS — I70.0 THORACIC AORTA ATHEROSCLEROSIS (HCC): ICD-10-CM

## 2020-03-03 DIAGNOSIS — N20.0 NEPHROLITHIASIS: ICD-10-CM

## 2020-03-03 DIAGNOSIS — E11.42 TYPE 2 DIABETES MELLITUS WITH DIABETIC POLYNEUROPATHY, WITHOUT LONG-TERM CURRENT USE OF INSULIN (HCC): ICD-10-CM

## 2020-03-03 DIAGNOSIS — Z00.00 ENCOUNTER FOR ANNUAL HEALTH EXAMINATION: ICD-10-CM

## 2020-03-03 DIAGNOSIS — I25.84 CORONARY ARTERY CALCIFICATION: ICD-10-CM

## 2020-03-03 DIAGNOSIS — N18.30 CKD (CHRONIC KIDNEY DISEASE) STAGE 3, GFR 30-59 ML/MIN (HCC): Chronic | ICD-10-CM

## 2020-03-03 DIAGNOSIS — E78.00 PURE HYPERCHOLESTEROLEMIA: ICD-10-CM

## 2020-03-03 DIAGNOSIS — I10 ESSENTIAL HYPERTENSION: ICD-10-CM

## 2020-03-03 DIAGNOSIS — G43.909 MIGRAINE WITHOUT STATUS MIGRAINOSUS, NOT INTRACTABLE, UNSPECIFIED MIGRAINE TYPE: ICD-10-CM

## 2020-03-03 DIAGNOSIS — M19.041 PRIMARY OSTEOARTHRITIS OF RIGHT HAND: ICD-10-CM

## 2020-03-03 DIAGNOSIS — K21.9 GASTROESOPHAGEAL REFLUX DISEASE WITHOUT ESOPHAGITIS: ICD-10-CM

## 2020-03-03 DIAGNOSIS — M54.16 LUMBAR RADICULITIS: ICD-10-CM

## 2020-03-03 DIAGNOSIS — R91.1 LUNG NODULE < 6CM ON CT: ICD-10-CM

## 2020-03-03 DIAGNOSIS — M19.042 PRIMARY OSTEOARTHRITIS OF LEFT HAND: ICD-10-CM

## 2020-03-03 PROBLEM — E11.311 DIABETIC MACULAR EDEMA (HCC): Status: RESOLVED | Noted: 2017-02-02 | Resolved: 2020-03-03

## 2020-03-03 PROBLEM — M25.551 RIGHT HIP PAIN: Status: RESOLVED | Noted: 2019-02-18 | Resolved: 2020-03-03

## 2020-03-03 PROBLEM — Z98.1 S/P LUMBAR FUSION: Status: RESOLVED | Noted: 2017-08-23 | Resolved: 2020-03-03

## 2020-03-03 PROBLEM — E11.3219: Status: RESOLVED | Noted: 2018-02-28 | Resolved: 2020-03-03

## 2020-03-03 PROBLEM — S22.000A COMPRESSION FRACTURE OF BODY OF THORACIC VERTEBRA (HCC): Status: RESOLVED | Noted: 2017-08-29 | Resolved: 2020-03-03

## 2020-03-03 PROBLEM — Z96.89 S/P INSERTION OF SPINAL CORD STIMULATOR: Status: RESOLVED | Noted: 2017-08-23 | Resolved: 2020-03-03

## 2020-03-03 PROBLEM — S05.91XA: Status: RESOLVED | Noted: 2017-08-03 | Resolved: 2020-03-03

## 2020-03-03 PROCEDURE — G0439 PPPS, SUBSEQ VISIT: HCPCS | Performed by: FAMILY MEDICINE

## 2020-03-03 PROCEDURE — 99397 PER PM REEVAL EST PAT 65+ YR: CPT | Performed by: FAMILY MEDICINE

## 2020-03-03 PROCEDURE — 96160 PT-FOCUSED HLTH RISK ASSMT: CPT | Performed by: FAMILY MEDICINE

## 2020-03-03 RX ORDER — METOPROLOL TARTRATE 100 MG/1
100 TABLET ORAL
COMMUNITY
Start: 2019-06-24 | End: 2020-07-22

## 2020-03-03 RX ORDER — VENLAFAXINE HYDROCHLORIDE 150 MG/1
150 TABLET, EXTENDED RELEASE ORAL DAILY
Qty: 90 TABLET | Refills: 3 | Status: SHIPPED | OUTPATIENT
Start: 2020-03-03 | End: 2020-03-10

## 2020-03-03 RX ORDER — LOSARTAN POTASSIUM 100 MG/1
100 TABLET ORAL
COMMUNITY
Start: 2019-08-26 | End: 2020-09-15

## 2020-03-03 RX ORDER — BLOOD-GLUCOSE METER
EACH MISCELLANEOUS
COMMUNITY
Start: 2020-01-29

## 2020-03-03 RX ORDER — LATANOPROST 50 UG/ML
SOLUTION/ DROPS OPHTHALMIC
COMMUNITY
Start: 2020-01-08 | End: 2020-06-10

## 2020-03-03 RX ORDER — LANCETS
EACH MISCELLANEOUS
COMMUNITY
Start: 2020-01-29 | End: 2020-05-08

## 2020-03-03 RX ORDER — VENLAFAXINE HYDROCHLORIDE 150 MG/1
150 TABLET, EXTENDED RELEASE ORAL
COMMUNITY
End: 2020-03-03

## 2020-03-03 RX ORDER — PANTOPRAZOLE SODIUM 40 MG/1
TABLET, DELAYED RELEASE ORAL
COMMUNITY
Start: 2019-12-26 | End: 2020-07-24

## 2020-03-03 RX ORDER — POTASSIUM CHLORIDE 750 MG/1
10 TABLET, EXTENDED RELEASE ORAL
COMMUNITY
End: 2020-03-03

## 2020-03-03 RX ORDER — POTASSIUM CHLORIDE 750 MG/1
10 TABLET, EXTENDED RELEASE ORAL 2 TIMES DAILY
Qty: 90 TABLET | Refills: 3 | Status: SHIPPED | OUTPATIENT
Start: 2020-03-03 | End: 2020-12-04

## 2020-03-03 RX ORDER — ATORVASTATIN CALCIUM 80 MG/1
80 TABLET, FILM COATED ORAL
COMMUNITY
Start: 2019-06-24

## 2020-03-03 RX ORDER — FUROSEMIDE 40 MG/1
40 TABLET ORAL
COMMUNITY
End: 2020-03-03

## 2020-03-03 RX ORDER — GLIMEPIRIDE 4 MG/1
4 TABLET ORAL
Qty: 90 TABLET | Refills: 0 | COMMUNITY
Start: 2020-03-03 | End: 2020-09-28

## 2020-03-03 RX ORDER — FUROSEMIDE 40 MG/1
40 TABLET ORAL DAILY
Qty: 90 TABLET | Refills: 3 | Status: SHIPPED | OUTPATIENT
Start: 2020-03-03

## 2020-03-03 RX ORDER — AMLODIPINE BESYLATE 5 MG/1
TABLET ORAL
COMMUNITY
Start: 2020-01-22 | End: 2020-07-24 | Stop reason: ALTCHOICE

## 2020-03-03 RX ORDER — HYDROCODONE BITARTRATE AND ACETAMINOPHEN 10; 325 MG/1; MG/1
1 TABLET ORAL 3 TIMES DAILY PRN
COMMUNITY
Start: 2020-02-11

## 2020-03-03 NOTE — PATIENT INSTRUCTIONS
Samina Genao's SCREENING SCHEDULE   Tests on this list are recommended by your physician but may not be covered, or covered at this frequency, by your insurer. Please check with your insurance carrier before scheduling to verify coverage.    PREVENTATIV No results found for this or any previous visit.  Limited to patients who meet one of the following criteria:   • Men who are 73-68 years old and have smoked more than 100 cigarettes in their lifetime   • Anyone with a family history    Colorectal Cancer Sc after 75 There are no preventive care reminders to display for this patient. Please get this Mammogram regularly   Immunizations      Influenza  Covered Annually No orders found for this or any previous visit.  Please get every year    Pneumococcal 13 (Prev also has information from the [address] [place] [address] regarding Advance Directives.

## 2020-03-03 NOTE — PROGRESS NOTES
HPI:   Maren Rodney is a 75year old female who presents for a MA (Medicare Advantage) [address] [place] [address] (Once per calendar year).     Her last annual assessment has been over 1 year: Annual Physical due on 06/07/2018         Fall/Risk Assessment   She has bee the following reasons: Other: No longer recommended by national guidelines        CAGE Alcohol screening   Eura Slider was screened for Alcohol abuse and had a score of 0 so is at low risk.     Patient Care Team: Patient Care Team:  Mino Macdonald MD OR), Take by mouth.  losartan 100 MG Oral Tab, Take 100 mg by mouth. Pantoprazole Sodium 40 MG Oral Tab EC,   Metoprolol Tartrate 100 MG Oral Tab, Take 100 mg by mouth. atorvastatin 80 MG Oral Tab, Take 80 mg by mouth.   Lancets 30G Does not apply Misc, 1 sister and son; Cancer in her brother, father, and son; Diabetes in her brother; Psychiatric in her father. SOCIAL HISTORY:   She  reports that she has never smoked. She has never used smokeless tobacco. She reports current alcohol use.  She reports that thyroid: not enlarged, symmetric, no tenderness/mass/nodules; no carotid bruit or JVD   Back:    Surgical scar across the left lower back. Surgical scar up and down the spine.   The area where she says there is a scab and is painful I see the end of a blue Tab; Take 1 tablet (40 mg total) by mouth daily. Continue losartan. Age-related osteoporosis without current pathological fracture  On calcitonin nasal spray.   Type 2 diabetes mellitus with diabetic polyneuropathy, without long-term current use of insuli difference, acquired  -     PODIATRY - INTERNAL  Referral for possible shoe insert  Encounter for annual health examination    Retained suture material in midline surgical scar. I remove this. Hopefully this pain will improve.     Diet assessment: good Glaucoma Screening      Ophthalmology Visit Annually: Diabetics, FHx Glaucoma, AA>50, > 65 Data entered on: 7/25/2017   Last Dilated Eye Exam 7/24/2017       Bone Density Screening      Dexascan Every two years Last Dexa Scan:   XR DEXA BONE DENS Monitoring of Persistent     Medications (ACE/ARB, digoxin diuretics, anticonvulsants.)    Potassium  Annually Potassium (mmol/L)   Date Value   04/13/2017 3.6     POTASSIUM (mmol/L)   Date Value   05/26/2015 4.7    No flowsheet data found.     Creatinine

## 2020-03-05 ENCOUNTER — TELEPHONE (OUTPATIENT)
Dept: FAMILY MEDICINE CLINIC | Facility: CLINIC | Age: 75
End: 2020-03-05

## 2020-03-05 NOTE — TELEPHONE ENCOUNTER
Fax recd from [facility] that Venlafaxine was denied due to not being on [employer]'s preferred drug list.    Paperwork placed in PA bin.

## 2020-03-10 ENCOUNTER — TELEPHONE (OUTPATIENT)
Dept: FAMILY MEDICINE CLINIC | Facility: CLINIC | Age: 75
End: 2020-03-10

## 2020-03-10 DIAGNOSIS — G43.909 MIGRAINE WITHOUT STATUS MIGRAINOSUS, NOT INTRACTABLE, UNSPECIFIED MIGRAINE TYPE: ICD-10-CM

## 2020-03-10 RX ORDER — VENLAFAXINE HYDROCHLORIDE 150 MG/1
150 TABLET, EXTENDED RELEASE ORAL DAILY
Qty: 90 TABLET | Refills: 3 | Status: SHIPPED | OUTPATIENT
Start: 2020-03-10

## 2020-03-10 NOTE — TELEPHONE ENCOUNTER
PC to pt . Patient aware that medication is not covered by pharmacy benefits. Patient requested script be sent to [facility] and she will use [facility]. Please see additional Telephone encounter.      [address] to release script  Bhavna polo

## 2020-03-10 NOTE — TELEPHONE ENCOUNTER
PC to pt    Venlafaxine  denied per [facility], patient has been on this medication for a long period of time. Did her insurance change?      Offer [facility] - cost approximately $11-$14 for #30 tabs

## 2020-03-12 ENCOUNTER — HOSPITAL ENCOUNTER (OUTPATIENT)
Dept: CT IMAGING | Age: 75
Discharge: HOME | End: 2020-03-12
Attending: FAMILY MEDICINE
Payer: MEDICARE

## 2020-03-12 ENCOUNTER — APPOINTMENT (OUTPATIENT)
Dept: LAB | Age: 75
End: 2020-03-12
Attending: FAMILY MEDICINE
Payer: MEDICARE

## 2020-03-12 DIAGNOSIS — R91.1 LUNG NODULE < 6CM ON CT: ICD-10-CM

## 2020-03-12 LAB
ALBUMIN SERPL-MCNC: 3.8 G/DL (ref 3.4–5)
ALBUMIN/GLOB SERPL: 0.9 {RATIO} (ref 1–2)
ALP LIVER SERPL-CCNC: 147 U/L (ref 55–142)
ALT SERPL-CCNC: 25 U/L (ref 13–56)
ANION GAP SERPL CALC-SCNC: 2 MMOL/L (ref 0–18)
AST SERPL-CCNC: 18 U/L (ref 15–37)
BILIRUB SERPL-MCNC: 0.4 MG/DL (ref 0.1–2)
BUN BLD-MCNC: 19 MG/DL (ref 7–18)
BUN/CREAT SERPL: 18.4 (ref 10–20)
CALCIUM BLD-MCNC: 9 MG/DL (ref 8.5–10.1)
CHLORIDE SERPL-SCNC: 107 MMOL/L (ref 98–112)
CHOLEST SMN-MCNC: 220 MG/DL (ref ?–200)
CO2 SERPL-SCNC: 31 MMOL/L (ref 21–32)
CREAT BLD-MCNC: 1.03 MG/DL (ref 0.55–1.02)
CREAT UR-SCNC: 225 MG/DL
EST. AVERAGE GLUCOSE BLD GHB EST-MCNC: 151 MG/DL (ref 68–126)
GLOBULIN PLAS-MCNC: 4.4 G/DL (ref 2.8–4.4)
GLUCOSE BLD-MCNC: 137 MG/DL (ref 70–99)
HBA1C MFR BLD HPLC: 6.9 % (ref ?–5.7)
HDLC SERPL-MCNC: 56 MG/DL (ref 40–59)
LDLC SERPL CALC-MCNC: 144 MG/DL (ref ?–100)
M PROTEIN MFR SERPL ELPH: 8.2 G/DL (ref 6.4–8.2)
MICROALBUMIN UR-MCNC: 1.43 MG/DL
MICROALBUMIN/CREAT 24H UR-RTO: 6.4 UG/MG (ref ?–30)
NONHDLC SERPL-MCNC: 164 MG/DL (ref ?–130)
OSMOLALITY SERPL CALC.SUM OF ELEC: 294 MOSM/KG (ref 275–295)
PATIENT FASTING Y/N/NP: YES
PATIENT FASTING Y/N/NP: YES
POTASSIUM SERPL-SCNC: 4 MMOL/L (ref 3.5–5.1)
SODIUM SERPL-SCNC: 140 MMOL/L (ref 136–145)
TRIGL SERPL-MCNC: 102 MG/DL (ref 30–149)
VLDLC SERPL CALC-MCNC: 20 MG/DL (ref 0–30)

## 2020-03-12 PROCEDURE — 80053 COMPREHEN METABOLIC PANEL: CPT | Performed by: FAMILY MEDICINE

## 2020-03-12 PROCEDURE — 83036 HEMOGLOBIN GLYCOSYLATED A1C: CPT | Performed by: FAMILY MEDICINE

## 2020-03-12 PROCEDURE — 80061 LIPID PANEL: CPT | Performed by: FAMILY MEDICINE

## 2020-03-12 PROCEDURE — 82043 UR ALBUMIN QUANTITATIVE: CPT | Performed by: FAMILY MEDICINE

## 2020-03-12 PROCEDURE — 82570 ASSAY OF URINE CREATININE: CPT | Performed by: FAMILY MEDICINE

## 2020-03-12 PROCEDURE — 36415 COLL VENOUS BLD VENIPUNCTURE: CPT | Performed by: FAMILY MEDICINE

## 2020-03-12 PROCEDURE — 71250 CT THORAX DX C-: CPT | Performed by: FAMILY MEDICINE

## 2020-03-25 ENCOUNTER — TELEPHONE (OUTPATIENT)
Dept: FAMILY MEDICINE CLINIC | Facility: CLINIC | Age: 75
End: 2020-03-25

## 2020-03-25 NOTE — TELEPHONE ENCOUNTER
Spoke to patient to discuss lab work and CT results. Patient verbalized understanding of information given.

## 2020-03-26 ENCOUNTER — OFFICE VISIT (OUTPATIENT)
Dept: PODIATRY CLINIC | Facility: CLINIC | Age: 75
End: 2020-03-26
Payer: COMMERCIAL

## 2020-03-26 DIAGNOSIS — M20.41 HAMMER TOES OF BOTH FEET: ICD-10-CM

## 2020-03-26 DIAGNOSIS — M21.70 LOWER LIMB LENGTH DIFFERENCE: ICD-10-CM

## 2020-03-26 DIAGNOSIS — M20.5X9 HALLUX LIMITUS, UNSPECIFIED LATERALITY: Primary | ICD-10-CM

## 2020-03-26 DIAGNOSIS — M20.42 HAMMER TOES OF BOTH FEET: ICD-10-CM

## 2020-03-26 DIAGNOSIS — E11.40 DIABETIC NEUROPATHY, PAINFUL (HCC): ICD-10-CM

## 2020-03-26 PROCEDURE — 99203 OFFICE O/P NEW LOW 30 MIN: CPT | Performed by: PODIATRIST

## 2020-03-26 NOTE — PROGRESS NOTES
Thomas Alatorre is a 75year old female. Patient presents with:  New Patient: ALDA this  - Last A1C on 3/12/20 of 6.9 - LOV w/ PCP on 3/3/20 - General diabetic foot check - feeling burning pain on bilateral feet radiating to the leg - pain scale 8/10. DAY OR USE AS DIRECTED. 200 strip 1   • CYCLOBENZAPRINE HCL 10 MG Oral Tab TAKE 1 TABLET BY MOUTH THREE TIMES DAILY 270 tablet 0   • CALCITONIN, SALMON, 200 UNIT/ACT Nasal Solution USE ONE SPRAY BY NASAL ROUTE DAILY 11.1 mL 1   • carbamide peroxide (DEBROX OTHER SURGICAL HISTORY      stimulator,spine x 2   • OTHER SURGICAL HISTORY      right heal spur   • OTHER SURGICAL HISTORY      bilateral carpal tunnel   • TONSILLECTOMY     • TOTAL KNEE REPLACEMENT      right      Family History   Problem Relation Age of dull sensation; reflexes normal.      limmb length inequality with the left leg being shorter than the right secondary to previous knee surgery and infection she is normal [place] nylon filament test but clinically she complains of this burning pa

## 2020-04-07 ENCOUNTER — TELEPHONE (OUTPATIENT)
Dept: FAMILY MEDICINE CLINIC | Facility: CLINIC | Age: 75
End: 2020-04-07

## 2020-04-07 NOTE — TELEPHONE ENCOUNTER
Wants to know if CZ received paperwork to get her Diabetic Shoes. Do we have to mail them back to her? She wants to make an appt so she can get her shoes. Pls call her back to advise.

## 2020-05-08 ENCOUNTER — VIRTUAL PHONE E/M (OUTPATIENT)
Dept: FAMILY MEDICINE CLINIC | Facility: CLINIC | Age: 75
End: 2020-05-08
Payer: MEDICARE

## 2020-05-08 DIAGNOSIS — M16.11 PRIMARY OSTEOARTHRITIS OF RIGHT HIP: ICD-10-CM

## 2020-05-08 DIAGNOSIS — E11.42 TYPE 2 DIABETES MELLITUS WITH DIABETIC POLYNEUROPATHY, WITHOUT LONG-TERM CURRENT USE OF INSULIN (HCC): Primary | ICD-10-CM

## 2020-05-08 PROCEDURE — 99442 PHONE E/M BY PHYS 11-20 MIN: CPT | Performed by: FAMILY MEDICINE

## 2020-05-08 RX ORDER — LANCETS
EACH MISCELLANEOUS
Qty: 200 EACH | Refills: 3 | Status: SHIPPED | OUTPATIENT
Start: 2020-05-08

## 2020-05-08 RX ORDER — BLOOD SUGAR DIAGNOSTIC
STRIP MISCELLANEOUS
Qty: 200 STRIP | Refills: 3 | Status: SHIPPED | OUTPATIENT
Start: 2020-05-08

## 2020-05-08 NOTE — PROGRESS NOTES
Virtual Telephone Check-In    Maritza Chapman verbally consents to a Virtual/Telephone Check-In visit on 05/08/20. Patient understands and accepts financial responsibility for any deductible, co-insurance and/or co-pays associated with this service.     D left knee, then several surgeries after that due to a fall & infection   • LAPAROSCOPIC CHOLECYSTECTOMY N/A 12/21/2016    Performed by Nils Solis MD at [facility] MAIN OR   • AKI BIOPSY STEREO NODULE 2 SITE BILAT (VMK=[id number])  2008 11/16'    Benign/B TABLET BY MOUTH THREE TIMES DAILY 270 tablet 0   • CALCITONIN, SALMON, 200 UNIT/ACT Nasal Solution USE ONE SPRAY BY NASAL ROUTE DAILY 11.1 mL 1   • carbamide peroxide (DEBROX) 6.5 % Otic Solution Place 5 drops into the right ear 2 (two) times daily.  For 3

## 2020-05-28 ENCOUNTER — TELEPHONE (OUTPATIENT)
Dept: FAMILY MEDICINE CLINIC | Facility: CLINIC | Age: 75
End: 2020-05-28

## 2020-05-28 DIAGNOSIS — E11.319 DIABETIC RETINOPATHY OF BOTH EYES ASSOCIATED WITH TYPE 2 DIABETES MELLITUS, MACULAR EDEMA PRESENCE UNSPECIFIED, UNSPECIFIED RETINOPATHY SEVERITY (HCC): ICD-10-CM

## 2020-05-28 DIAGNOSIS — E11.42 TYPE 2 DIABETES MELLITUS WITH DIABETIC POLYNEUROPATHY, WITHOUT LONG-TERM CURRENT USE OF INSULIN (HCC): Primary | ICD-10-CM

## 2020-06-05 ENCOUNTER — TELEPHONE (OUTPATIENT)
Dept: FAMILY MEDICINE CLINIC | Facility: CLINIC | Age: 75
End: 2020-06-05

## 2020-06-05 DIAGNOSIS — M16.11 PRIMARY OSTEOARTHRITIS OF RIGHT HIP: ICD-10-CM

## 2020-06-05 DIAGNOSIS — E11.319 DIABETIC RETINOPATHY OF BOTH EYES ASSOCIATED WITH TYPE 2 DIABETES MELLITUS, MACULAR EDEMA PRESENCE UNSPECIFIED, UNSPECIFIED RETINOPATHY SEVERITY (HCC): Primary | ICD-10-CM

## 2020-06-05 NOTE — TELEPHONE ENCOUNTER
Dr. Jesús Phillips, spoke with patient. Patient is waiting to receive a hip replacement and states she has been seeing a pain specialist, Dr. Jonas Vail with [address] [place] [address].  Patient states she needs a referral before she is able to make a June appointment with

## 2020-06-05 NOTE — TELEPHONE ENCOUNTER
Okay to place referrals. I am not certain if this pain specialist is in her manner not work. Dr. Ave Kerns is definitely in our network.

## 2020-06-05 NOTE — TELEPHONE ENCOUNTER
Phone call received at 7:50 AM on June 5, 2020. Message states hip replacement patient needs to get a referral from the doctor, patient out of pain medicine. Please call patient. Set up phone visit if necessary.

## 2020-06-10 ENCOUNTER — TELEPHONE (OUTPATIENT)
Dept: FAMILY MEDICINE CLINIC | Facility: CLINIC | Age: 75
End: 2020-06-10

## 2020-06-10 DIAGNOSIS — M96.1 POSTLAMINECTOMY SYNDROME, LUMBAR REGION: ICD-10-CM

## 2020-06-10 RX ORDER — CYCLOBENZAPRINE HCL 10 MG
10 TABLET ORAL 3 TIMES DAILY
Qty: 270 TABLET | Refills: 0 | Status: SHIPPED | OUTPATIENT
Start: 2020-06-10 | End: 2020-07-24

## 2020-06-10 RX ORDER — LATANOPROST 50 UG/ML
1 SOLUTION/ DROPS OPHTHALMIC DAILY
Qty: 7.5 ML | Refills: 1 | Status: SHIPPED | OUTPATIENT
Start: 2020-06-10 | End: 2020-09-03

## 2020-06-10 NOTE — TELEPHONE ENCOUNTER
LV    LR   9-11-17 CYCLOBENZAPRINE HCL 10 MG Oral Tab       1-8-20 latanoprost 0.005 % Ophthalmic Solution

## 2020-06-10 NOTE — TELEPHONE ENCOUNTER
PA request recd for Cyclobenzaprine HCl 10mg tablets    KEY   [id number]    (They sent over two faxes w/ two different key numbers)    2nd KEY  [id number]

## 2020-06-16 ENCOUNTER — TELEPHONE (OUTPATIENT)
Dept: FAMILY MEDICINE CLINIC | Facility: CLINIC | Age: 75
End: 2020-06-16

## 2020-06-16 NOTE — TELEPHONE ENCOUNTER
NEEDS THE [facility] REFERRAL FAXE OVER TO THEM FOR HER VISIT.       [address] [address]  ATT   RIGOBERTO

## 2020-06-16 NOTE — TELEPHONE ENCOUNTER
Received fax for an update on authorization for patient. Put in [address] Triage    See note regarding network for patient.

## 2020-06-18 ENCOUNTER — TELEPHONE (OUTPATIENT)
Dept: FAMILY MEDICINE CLINIC | Facility: CLINIC | Age: 75
End: 2020-06-18

## 2020-06-18 NOTE — TELEPHONE ENCOUNTER
Patient is calling - she is in a lot of pain. Due for hip replacement surgery. States that the pain center at [address] needs authorization for her to receive pain medicine. Due to pandemic she states she has not been able to get injections.     Please

## 2020-07-01 ENCOUNTER — TELEPHONE (OUTPATIENT)
Dept: FAMILY MEDICINE CLINIC | Facility: CLINIC | Age: 75
End: 2020-07-01

## 2020-07-01 DIAGNOSIS — M16.11 PRIMARY OSTEOARTHRITIS OF RIGHT HIP: Primary | ICD-10-CM

## 2020-07-01 DIAGNOSIS — G89.4 CHRONIC PAIN SYNDROME: ICD-10-CM

## 2020-07-01 NOTE — TELEPHONE ENCOUNTER
Wants to know the status of her auth for pain injections. Code [id number]      Please call her back to advise.

## 2020-07-06 ENCOUNTER — TELEPHONE (OUTPATIENT)
Dept: FAMILY MEDICINE CLINIC | Facility: CLINIC | Age: 75
End: 2020-07-06

## 2020-07-06 NOTE — TELEPHONE ENCOUNTER
Advised pt, needs to contact [facility] pain center for her covid testing.  covid testing can only be ordered at [facility] if pt is having a procedure at [facility].  Pt going to [facility]  Also referral for visit with Dr. Adams Martinez approved, however injections n

## 2020-07-06 NOTE — TELEPHONE ENCOUNTER
Patient is calling stating that Kirsten Gaines has reach out to us and she has as well to have an order placed for Covid testing and that she needs referrals for her two pain injections (right hip and lower back.)  Injections scheduled for 7/22.     Please advi

## 2020-07-07 ENCOUNTER — TELEPHONE (OUTPATIENT)
Dept: FAMILY MEDICINE CLINIC | Facility: CLINIC | Age: 75
End: 2020-07-07

## 2020-07-07 NOTE — TELEPHONE ENCOUNTER
Attempted to call patient three times, phone number will not dial, not ringing. Patient is out of network for that office, the injections office will not do the referral for the injections, patient has to find a pain provider in network.

## 2020-07-07 NOTE — TELEPHONE ENCOUNTER
Specialist office says that  We are out of network and we need to  can get approval for this from insurance carrier,  with the CPT codes provided. [employer] from Dr Alba Smith office calling, says they will have to cancel procdeure for tomorrow.  Has been try

## 2020-07-07 NOTE — TELEPHONE ENCOUNTER
Pt spoke with insurance about her injections. Pls call to discuss asap. She's scheduled tomorrow.   Her insurance advised her she can go to the same facility but needs Dr. Julio Cesar Nugent referral.

## 2020-07-07 NOTE — TELEPHONE ENCOUNTER
Spoke to Paola and informed her we will not be doing authorizations for injections. Patient aware she has to see in network physician.

## 2020-07-13 ENCOUNTER — VIRTUAL PHONE E/M (OUTPATIENT)
Dept: FAMILY MEDICINE CLINIC | Facility: CLINIC | Age: 75
End: 2020-07-13
Payer: MEDICARE

## 2020-07-13 DIAGNOSIS — G89.29 CHRONIC RIGHT-SIDED LOW BACK PAIN WITH RIGHT-SIDED SCIATICA: ICD-10-CM

## 2020-07-13 DIAGNOSIS — M16.11 ARTHRITIS OF RIGHT HIP: Primary | ICD-10-CM

## 2020-07-13 DIAGNOSIS — M54.41 CHRONIC RIGHT-SIDED LOW BACK PAIN WITH RIGHT-SIDED SCIATICA: ICD-10-CM

## 2020-07-13 PROCEDURE — 99442 PHONE E/M BY PHYS 11-20 MIN: CPT | Performed by: FAMILY MEDICINE

## 2020-07-13 NOTE — PROGRESS NOTES
HPI:    Patient ID: Pablo Richter is a 67year old female. Hip Pain    Pain location: right hip. This is a chronic problem. The current episode started more than 1 year ago. The problem occurs constantly. The problem has been gradually worsening.  The pa Tab Take 100 mg by mouth. • HYDROcodone-acetaminophen  MG Oral Tab Take 1 tablet by mouth 3 (three) times daily as needed. • Pantoprazole Sodium 40 MG Oral Tab EC      • Metoprolol Tartrate 100 MG Oral Tab Take 100 mg by mouth.      • atorvast PAIN MANAGEMENT - EXTERNAL    2. Chronic right-sided low back pain with right-sided sciatica  Pt advised to f/u with pain management to continue getting injections.   - PAIN MANAGEMENT - EXTERNAL    Meds This Visit:  Requested Prescriptions      No prescrip

## 2020-07-14 ENCOUNTER — TELEPHONE (OUTPATIENT)
Dept: FAMILY MEDICINE CLINIC | Facility: CLINIC | Age: 75
End: 2020-07-14

## 2020-07-16 ENCOUNTER — TELEPHONE (OUTPATIENT)
Dept: FAMILY MEDICINE CLINIC | Facility: CLINIC | Age: 75
End: 2020-07-16

## 2020-07-16 DIAGNOSIS — E11.319 DIABETIC RETINOPATHY OF BOTH EYES ASSOCIATED WITH TYPE 2 DIABETES MELLITUS, MACULAR EDEMA PRESENCE UNSPECIFIED, UNSPECIFIED RETINOPATHY SEVERITY (HCC): Primary | ICD-10-CM

## 2020-07-21 NOTE — TELEPHONE ENCOUNTER
Patient stated pharmacy sent over 2 faxes for her Metoprolol I don't see it being filled    [facility] to [facility]

## 2020-07-22 RX ORDER — METOPROLOL TARTRATE 100 MG/1
100 TABLET ORAL DAILY
Qty: 90 TABLET | Refills: 0 | Status: SHIPPED | OUTPATIENT
Start: 2020-07-22 | End: 2020-10-20

## 2020-07-22 NOTE — TELEPHONE ENCOUNTER
Approve/deny metoprolol  Last visit was a virtual visit 5/8/2020  Last filled 6/2019  This was previously filled by different MD

## 2020-07-24 ENCOUNTER — OFFICE VISIT (OUTPATIENT)
Dept: FAMILY MEDICINE CLINIC | Facility: CLINIC | Age: 75
End: 2020-07-24
Payer: MEDICARE

## 2020-07-24 ENCOUNTER — TELEPHONE (OUTPATIENT)
Dept: FAMILY MEDICINE CLINIC | Facility: CLINIC | Age: 75
End: 2020-07-24

## 2020-07-24 VITALS
TEMPERATURE: 98 F | HEART RATE: 57 BPM | BODY MASS INDEX: 35.36 KG/M2 | HEIGHT: 66 IN | RESPIRATION RATE: 18 BRPM | DIASTOLIC BLOOD PRESSURE: 74 MMHG | OXYGEN SATURATION: 98 % | SYSTOLIC BLOOD PRESSURE: 150 MMHG | WEIGHT: 220 LBS

## 2020-07-24 DIAGNOSIS — K21.9 GASTROESOPHAGEAL REFLUX DISEASE WITHOUT ESOPHAGITIS: ICD-10-CM

## 2020-07-24 DIAGNOSIS — I10 ESSENTIAL HYPERTENSION: ICD-10-CM

## 2020-07-24 DIAGNOSIS — R51.9 WORSENING HEADACHES: Primary | ICD-10-CM

## 2020-07-24 DIAGNOSIS — J44.9 ASTHMA WITH COPD (CHRONIC OBSTRUCTIVE PULMONARY DISEASE) (HCC): Chronic | ICD-10-CM

## 2020-07-24 DIAGNOSIS — M96.1 POSTLAMINECTOMY SYNDROME, LUMBAR REGION: ICD-10-CM

## 2020-07-24 PROCEDURE — 3077F SYST BP >= 140 MM HG: CPT | Performed by: PHYSICIAN ASSISTANT

## 2020-07-24 PROCEDURE — 3078F DIAST BP <80 MM HG: CPT | Performed by: PHYSICIAN ASSISTANT

## 2020-07-24 PROCEDURE — 99214 OFFICE O/P EST MOD 30 MIN: CPT | Performed by: PHYSICIAN ASSISTANT

## 2020-07-24 PROCEDURE — 3008F BODY MASS INDEX DOCD: CPT | Performed by: PHYSICIAN ASSISTANT

## 2020-07-24 RX ORDER — MELOXICAM 7.5 MG/1
7.5 TABLET ORAL
COMMUNITY
Start: 2020-07-09

## 2020-07-24 RX ORDER — CYCLOBENZAPRINE HCL 10 MG
10 TABLET ORAL 3 TIMES DAILY
Qty: 180 TABLET | Refills: 2 | Status: SHIPPED | OUTPATIENT
Start: 2020-07-24 | End: 2021-01-07

## 2020-07-24 RX ORDER — ALBUTEROL SULFATE 90 UG/1
1-2 AEROSOL, METERED RESPIRATORY (INHALATION) EVERY 6 HOURS PRN
Qty: 3 INHALER | Refills: 0 | Status: SHIPPED | OUTPATIENT
Start: 2020-07-24 | End: 2020-09-04

## 2020-07-24 RX ORDER — SUMATRIPTAN 50 MG/1
50 TABLET, FILM COATED ORAL EVERY 2 HOUR PRN
Qty: 10 TABLET | Refills: 2 | Status: SHIPPED | OUTPATIENT
Start: 2020-07-24 | End: 2020-10-09

## 2020-07-24 RX ORDER — PANTOPRAZOLE SODIUM 40 MG/1
40 TABLET, DELAYED RELEASE ORAL
Qty: 120 TABLET | Refills: 2 | Status: SHIPPED | OUTPATIENT
Start: 2020-07-24 | End: 2021-01-07

## 2020-07-24 NOTE — PROGRESS NOTES
HPI:    Patient ID: Christoph Lennon is a 67year old female. HPI   Patient presents today c/o worsening migraines over the last several weeks. Symptoms more frequent and lasting longer. Has had migraines for 5-10 years but never this bad.  She has been on 120 tablet 2   • cyclobenzaprine 10 MG Oral Tab Take 1 tablet (10 mg total) by mouth 3 (three) times daily.  180 tablet 2   • Albuterol Sulfate  (90 Base) MCG/ACT Inhalation Aero Soln Inhale 1-2 puffs into the lungs every 6 (six) hours as needed for Allergies:  Adhesive Tape           RASH    Comment:Can use paper tape  Metformin               DIARRHEA  Statins                 RASH   PHYSICAL EXAM:   Physical Exam   Nursing note and vitals reviewed.    Constitutional: She is oriented to person, caroline (chronic obstructive pulmonary disease) (HCC)  Overall stable. Provided refill of albuterol inhaler. 4. Gastroesophageal reflux disease without esophagitis  Stable. Refill of PPI. Continue to avoid items that exacerbate symptoms.     5. Essential hype

## 2020-07-24 NOTE — TELEPHONE ENCOUNTER
Patient saw Dr. Ck Aldana - Opthamology, who referred her to Dr. Mary Lyles. Dr. Mary Lyles not in network. Left a message as to why she needs to see someone else. Tried to call Dr. Char Carpio office but they were on service.

## 2020-09-03 RX ORDER — LATANOPROST 50 UG/ML
SOLUTION/ DROPS OPHTHALMIC
Qty: 7.5 ML | Refills: 1 | Status: SHIPPED | OUTPATIENT
Start: 2020-09-03

## 2020-09-03 NOTE — TELEPHONE ENCOUNTER
Rx Request  latanoprost 0.005 % Ophthalmic Solution    Disp:      7.5 ml              R: 1    Last Visit: 07/24/2020    Last Refilled: 06/10/2020    Protocol Passed?  Margoth Rowe  ]       No[  ]

## 2020-09-04 RX ORDER — ALBUTEROL SULFATE 90 UG/1
AEROSOL, METERED RESPIRATORY (INHALATION)
Qty: 25.5 G | Refills: 0 | Status: SHIPPED | OUTPATIENT
Start: 2020-09-04

## 2020-09-04 NOTE — TELEPHONE ENCOUNTER
Rx Request     Albuterol Sulfate  (90 Base) MCG/ACT Inhalation Aero Soln    Inhale 1-2 puffs into the lungs every 6 (six) hours as needed for Wheezing. Disp:  3 INHALER'S                  R:0      Associated Dx:ASTHMA    Last Visit:7/24/0    Last Refilled:7/24/20    Protocol Passed?  Yes[  ]       No[ x ]

## 2020-09-15 RX ORDER — LOSARTAN POTASSIUM 100 MG/1
100 TABLET ORAL DAILY
Qty: 90 TABLET | Refills: 3 | Status: SHIPPED | OUTPATIENT
Start: 2020-09-15 | End: 2021-01-15

## 2020-09-28 DIAGNOSIS — E11.42 TYPE 2 DIABETES MELLITUS WITH DIABETIC POLYNEUROPATHY, WITHOUT LONG-TERM CURRENT USE OF INSULIN (HCC): ICD-10-CM

## 2020-09-28 RX ORDER — GLIMEPIRIDE 4 MG/1
4 TABLET ORAL
Qty: 90 TABLET | Refills: 0 | Status: SHIPPED | OUTPATIENT
Start: 2020-09-28

## 2020-09-29 ENCOUNTER — TELEPHONE (OUTPATIENT)
Dept: FAMILY MEDICINE CLINIC | Facility: CLINIC | Age: 75
End: 2020-09-29

## 2020-09-29 DIAGNOSIS — M25.551 HIP PAIN, CHRONIC, RIGHT: ICD-10-CM

## 2020-09-29 DIAGNOSIS — M46.1 INFLAMMATION OF SACROILIAC JOINT (HCC): Primary | ICD-10-CM

## 2020-09-29 DIAGNOSIS — G89.29 OTHER CHRONIC PAIN: ICD-10-CM

## 2020-09-29 DIAGNOSIS — G89.29 HIP PAIN, CHRONIC, RIGHT: ICD-10-CM

## 2020-09-29 NOTE — TELEPHONE ENCOUNTER
Pt seen by Dr. Isabelle Vasquez yesterday, needs hip injection but wants referral to a different pain specialist.  Please advise who you recommend?

## 2020-09-29 NOTE — TELEPHONE ENCOUNTER
Pt had appt with Dr. Geovany Ch yesterday - she does not want to see him again. She needs a pain injection (hip). She wants a referral to another pain doctor.

## 2020-10-05 ENCOUNTER — OFFICE VISIT (OUTPATIENT)
Dept: PAIN CLINIC | Facility: CLINIC | Age: 75
End: 2020-10-05
Payer: MEDICARE

## 2020-10-05 VITALS
BODY MASS INDEX: 35.68 KG/M2 | DIASTOLIC BLOOD PRESSURE: 84 MMHG | OXYGEN SATURATION: 98 % | WEIGHT: 222 LBS | HEIGHT: 66 IN | HEART RATE: 65 BPM | SYSTOLIC BLOOD PRESSURE: 136 MMHG

## 2020-10-05 DIAGNOSIS — M16.11 PRIMARY OSTEOARTHRITIS OF RIGHT HIP: Primary | ICD-10-CM

## 2020-10-05 PROCEDURE — 99203 OFFICE O/P NEW LOW 30 MIN: CPT | Performed by: ANESTHESIOLOGY

## 2020-10-05 PROCEDURE — 3008F BODY MASS INDEX DOCD: CPT | Performed by: ANESTHESIOLOGY

## 2020-10-05 PROCEDURE — 3079F DIAST BP 80-89 MM HG: CPT | Performed by: ANESTHESIOLOGY

## 2020-10-05 PROCEDURE — 3075F SYST BP GE 130 - 139MM HG: CPT | Performed by: ANESTHESIOLOGY

## 2020-10-05 RX ORDER — MAGNESIUM OXIDE 400 MG (241.3 MG MAGNESIUM) TABLET
400 TABLET
COMMUNITY
End: 2020-12-15 | Stop reason: ALTCHOICE

## 2020-10-05 RX ORDER — CEPHALEXIN 500 MG/1
CAPSULE ORAL
COMMUNITY
Start: 2020-08-21 | End: 2020-12-15 | Stop reason: ALTCHOICE

## 2020-10-05 NOTE — PROGRESS NOTES
PHYSICAL EXAM:   Physical Exam   Constitutional:           Patient presents in office today with reported pain in lower back, right hip    Current pain level reported = 8/10    Last reported dose of HYDROcodone-acetaminophen  MG Oral Tab patient st

## 2020-10-05 NOTE — H&P
Name: Sena Quezada   : 1945   DOS: 10/5/2020     Chief complaint: Low back and hip pain    History of present illness:  Sena uQezada is a 67year old female who presents today to discuss low back and right-sided hip pain.   The patient was previ mg total) by mouth daily.  90 tablet 3   • ALBUTEROL SULFATE  (90 Base) MCG/ACT Inhalation Aero Soln INHALE ONE TO TWO PUFFS EVERY 6 HOURS AS NEEDED FOR WHEEZING. 25.5 g 0   • LATANOPROST 0.005 % Ophthalmic Solution INSTILL ONE DROP INTO EACH EYE ONC SURGERY      x6    • BACK SURGERY      x 2 neck   • CYSTOSCOPY URETEROSCOPY Left 4/17/2017    Performed by Nikolai Del Real MD at [facility] MAIN OR   • HYSTERECTOMY     • KNEE REPLACEMENT SURGERY      left knee, then several surgeries after that due to a fall XII are grossly intact. Examination of the lower back: Forward leaning gait. Radiology diagnostic studies:   MRI of the hip and SI joint reviewed.   There is evidence of mild degenerative change    Assessment:  Primary osteoarthritis of right hip

## 2020-10-08 ENCOUNTER — TELEPHONE (OUTPATIENT)
Dept: FAMILY MEDICINE CLINIC | Facility: CLINIC | Age: 75
End: 2020-10-08

## 2020-10-08 DIAGNOSIS — M54.41 CHRONIC RIGHT-SIDED LOW BACK PAIN WITH RIGHT-SIDED SCIATICA: ICD-10-CM

## 2020-10-08 DIAGNOSIS — M16.11 PRIMARY OSTEOARTHRITIS OF RIGHT HIP: Primary | ICD-10-CM

## 2020-10-08 DIAGNOSIS — G89.29 CHRONIC RIGHT-SIDED LOW BACK PAIN WITH RIGHT-SIDED SCIATICA: ICD-10-CM

## 2020-10-08 NOTE — TELEPHONE ENCOUNTER
Spoke to patient. Dr. Hemalatha Groves is out of network.  Patient given a name of Dr. Sary Lilly - referral entered - pending approval.

## 2020-10-08 NOTE — TELEPHONE ENCOUNTER
Immediate Brief Procedure Note    Patient: Jackeline Metz    Pre-op Dx: R knee oa    Post-op Dx: same    Procedure: Right TKA    Surgeon:  Marck Rodriguez MD    Assistants: none    Anesthesia Staff: CRNA: Maral Hernandez CRNA  Anesthesiologist: Danuta Decker MD    Anesthesia Type: spinal    Findings: consistent with Dx    Estimated Blood Loss: minimal     Complications: none    Specimens Removed: none   Pt needs referral to pain specialist out of [facility] for her back injections - the current doctors suggested do not sedate before injections. This doctor has treated pt in the past.  His name is [place], pain speciality.   pls call to discuss

## 2020-10-09 RX ORDER — SUMATRIPTAN 50 MG/1
TABLET, FILM COATED ORAL
Qty: 9 TABLET | Refills: 0 | Status: SHIPPED | OUTPATIENT
Start: 2020-10-09 | End: 2020-12-05

## 2020-10-20 RX ORDER — METOPROLOL TARTRATE 100 MG/1
TABLET ORAL
Qty: 90 TABLET | Refills: 0 | Status: SHIPPED | OUTPATIENT
Start: 2020-10-20 | End: 2020-12-15

## 2020-10-27 ENCOUNTER — TELEPHONE (OUTPATIENT)
Dept: FAMILY MEDICINE CLINIC | Facility: CLINIC | Age: 75
End: 2020-10-27

## 2020-10-27 DIAGNOSIS — Z20.828 EXPOSURE TO SARS-ASSOCIATED CORONAVIRUS: Primary | ICD-10-CM

## 2020-10-27 DIAGNOSIS — R09.81 CONGESTION OF NASAL SINUS: ICD-10-CM

## 2020-10-27 NOTE — TELEPHONE ENCOUNTER
Spoke with patient:  Got home Sunday from [place]. Windows were open where she stayed. Has Cough, nyquil helping with stuffy nose. One time diarrhea no mucous no blood. Denies fever CP SOB weakness. Advised patient to stay hydrated.      Dr. Amalia Meeks

## 2020-10-27 NOTE — TELEPHONE ENCOUNTER
PT CALLING AND WANTS TO TALK TO NURSE. STATES SHE HAS COLD SX.    STUFFY HEAD AND SOME DIARRHEA. THINKS THIS IS FROM HER ORANGE JUICE SHE DRANK. DOES NOT HAVE A RUNNY NOSE AND FEVER.     WAS AT HER SONS AND THEY LEFT THE WINDOWS OPEN ON HER AND SHE

## 2020-10-28 NOTE — TELEPHONE ENCOUNTER
Melinda Trevino MD  [facility] Clinical Staff 13 hours ago (7:40 PM)     Okay for BT41 test      Ordered. Patient notified, verbalized understanding. Patient will call PRN if symptoms worsen. Patient given CS number to call to schedule.

## 2020-10-30 ENCOUNTER — APPOINTMENT (OUTPATIENT)
Dept: LAB | Age: 75
End: 2020-10-30
Attending: FAMILY MEDICINE
Payer: MEDICARE

## 2020-10-30 DIAGNOSIS — Z20.828 EXPOSURE TO SARS-ASSOCIATED CORONAVIRUS: ICD-10-CM

## 2020-10-30 DIAGNOSIS — R09.81 CONGESTION OF NASAL SINUS: ICD-10-CM

## 2020-11-04 ENCOUNTER — TELEPHONE (OUTPATIENT)
Dept: FAMILY MEDICINE CLINIC | Facility: CLINIC | Age: 75
End: 2020-11-04

## 2020-11-04 NOTE — TELEPHONE ENCOUNTER
Spoke with patient, patient verbalized understanding. Patient states she has no one to take her today to be evaluated and she does not drive. Patient states she will call 911, advised to notify dispatch of covid result and reasons why she will need to be evaluated -weakness chest pressure, V/D- evaluate electrolytes. Patient verbalized understanding. RC as fyi.

## 2020-11-04 NOTE — TELEPHONE ENCOUNTER
Weakness is concerning. Given her age lets have her evaluated, maybe just needs IV fluids. She should notify er she had Covid prior to entering.

## 2020-11-04 NOTE — TELEPHONE ENCOUNTER
Component   Ref Range & Units    SARS-CoV-2 (COVID-19)   Not Detected DetectedAbnormal       Spoke with patient:  She reports she is weak, no appetite, had diarrhea 3-4 days- last episode was Monday. Patient was Vomiting 1-2 days, lest episode Sunday. Drinking plenty of water-tolerating, no appetite- toast, 2 days of not eating. Reports she is having Ginger ale, chicken noodle soup delivered today. Patient states:  Chest pain/pressure reports it is better than before: inhaler helps a Little bit, but still there since the beginning of symptoms. Dr. Tamanna Morales, do you want Annie Carleen for patient? Or UC/ER?

## 2020-11-09 NOTE — TELEPHONE ENCOUNTER
[phone] [facility]  No answer on [phone]. RC as fyi- called to check on patient.

## 2020-11-30 ENCOUNTER — TELEPHONE (OUTPATIENT)
Dept: FAMILY MEDICINE CLINIC | Facility: CLINIC | Age: 75
End: 2020-11-30

## 2020-11-30 DIAGNOSIS — Z12.11 ENCOUNTER FOR SCREENING COLONOSCOPY: Primary | ICD-10-CM

## 2020-11-30 NOTE — TELEPHONE ENCOUNTER
She just finished quarantining for covid. She wants to get her colonoscopy before the end of the year.   She will need a referral

## 2020-11-30 NOTE — TELEPHONE ENCOUNTER
Patient notified, verbalized understanding. Patient reports she was hospitalized and went to a rehab facility because she was so sick with [id number], she is much better now she states. RC, she thanked us.

## 2020-12-04 DIAGNOSIS — I10 ESSENTIAL HYPERTENSION: ICD-10-CM

## 2020-12-04 RX ORDER — POTASSIUM CHLORIDE 750 MG/1
TABLET, EXTENDED RELEASE ORAL
Qty: 180 TABLET | Refills: 0 | Status: SHIPPED | OUTPATIENT
Start: 2020-12-04 | End: 2021-03-04

## 2020-12-04 NOTE — TELEPHONE ENCOUNTER
Last ov 7/24/2020    Last refill 3/3/2020      .   Potassium:    Lab Results   Component Value Date    K 4.0 03/12/2020    K 3.6 04/13/2017    K 3.9 02/15/2017

## 2020-12-05 RX ORDER — SUMATRIPTAN 50 MG/1
TABLET, FILM COATED ORAL
Qty: 9 TABLET | Refills: 0 | Status: SHIPPED | OUTPATIENT
Start: 2020-12-05

## 2020-12-08 DIAGNOSIS — Z12.11 ENCOUNTER FOR SCREENING COLONOSCOPY: Primary | ICD-10-CM

## 2020-12-11 ENCOUNTER — LAB ENCOUNTER (OUTPATIENT)
Dept: LAB | Age: 75
End: 2020-12-11
Attending: INTERNAL MEDICINE
Payer: MEDICARE

## 2020-12-11 DIAGNOSIS — Z01.818 PRE-OP TESTING: ICD-10-CM

## 2020-12-14 PROBLEM — Z12.11 SPECIAL SCREENING FOR MALIGNANT NEOPLASM OF COLON: Status: ACTIVE | Noted: 2020-12-14

## 2020-12-15 ENCOUNTER — VIRTUAL PHONE E/M (OUTPATIENT)
Dept: FAMILY MEDICINE CLINIC | Facility: CLINIC | Age: 75
End: 2020-12-15
Payer: MEDICARE

## 2020-12-15 DIAGNOSIS — I10 ESSENTIAL HYPERTENSION: Primary | ICD-10-CM

## 2020-12-15 PROCEDURE — 99213 OFFICE O/P EST LOW 20 MIN: CPT | Performed by: FAMILY MEDICINE

## 2020-12-15 RX ORDER — METOPROLOL TARTRATE 100 MG/1
TABLET ORAL
Qty: 135 TABLET | Refills: 1 | COMMUNITY
Start: 2020-12-15 | End: 2021-01-14

## 2020-12-15 NOTE — PROGRESS NOTES
Virtual Telephone Check-In    Bart Amos verbally consents to a Virtual/Telephone Check-In visit on 12/15/20. Patient understands and accepts financial responsibility for any deductible, co-insurance and/or co-pays associated with this service. spur   • OTHER SURGICAL HISTORY      bilateral carpal tunnel   • TONSILLECTOMY     • TOTAL KNEE REPLACEMENT      right     MEDICATIONS:  Current Outpatient Medications   Medication Sig Dispense Refill   • SUMATRIPTAN SUCCINATE 50 MG Oral Tab TAKE 1 TABLET Tab Take 80 mg by mouth. • Lancets 30G Does not apply Misc 1 each by Does not apply route.      • Blood Glucose Monitoring Suppl (ACCU-CHEK BRIANNA PLUS) w/Device Does not apply Kit      • furosemide 40 MG Oral Tab Take 1 tablet (40 mg total) by mouth shana of visitation. There are limitations of this visit as no physical exam could be performed. Every conscious effort was taken to allow for sufficient and adequate time.   This billing was spent on reviewing labs, medications, radiology tests and decision ma

## 2020-12-30 ENCOUNTER — TELEPHONE (OUTPATIENT)
Dept: FAMILY MEDICINE CLINIC | Facility: CLINIC | Age: 75
End: 2020-12-30

## 2020-12-30 NOTE — TELEPHONE ENCOUNTER
DID WE GET THE PHYSICIAN ORDER AND PLAN OF CARE FAXED TODAY AT 1:44PM AND 10:20AM TODAY? PLS CALL TO [facility].

## 2020-12-30 NOTE — TELEPHONE ENCOUNTER
PT NEEDS HIP SURGERY AND WANTS TO KNOW IF SHE CAN SEE DR. NASCIMENTO   SHE THINKS HE IS OUT OF [facility] IN [place]  PLEASE [facility]

## 2021-01-06 ENCOUNTER — TELEPHONE (OUTPATIENT)
Dept: ADMINISTRATIVE | Age: 76
End: 2021-01-06

## 2021-01-06 NOTE — TELEPHONE ENCOUNTER
.Reason for the order/referral:REFERRED BY DR. ARMANI SIMMONS (PT WAS UNSURE WHAT IT WAS FOR  PCP:  Dennise Frias MD  Refer to Provider (first and last name): DR. Wanda Rios  Specialty: RETINA / OPHTHALMOLOGY   Patient Insurance: Payor: Nakul Mom / Plan: [employer] HMO / Product Type: Medicare /   Duration/Summary of Symptoms: NA  Is this a result of an injury: (Y/N) NO  Location of pain: NA  Has the patient been seen by their PCP for this condition: (Y/N) NO   If \"Y\", date patient last seen for this condition:   DX Code:     CPT Code for visit: EVAL AND TREAT   Number of visits requested:3  If Requesting Out of Network Provider, Please provide reason:  Patient Call Back Number:  [phone]

## 2021-01-07 DIAGNOSIS — M96.1 POSTLAMINECTOMY SYNDROME, LUMBAR REGION: ICD-10-CM

## 2021-01-07 RX ORDER — PANTOPRAZOLE SODIUM 40 MG/1
TABLET, DELAYED RELEASE ORAL
Qty: 180 TABLET | Refills: 0 | Status: SHIPPED | OUTPATIENT
Start: 2021-01-07

## 2021-01-07 RX ORDER — CYCLOBENZAPRINE HCL 10 MG
TABLET ORAL
Qty: 270 TABLET | Refills: 0 | Status: SHIPPED | OUTPATIENT
Start: 2021-01-07 | End: 2021-03-10

## 2021-01-11 ENCOUNTER — TELEPHONE (OUTPATIENT)
Dept: FAMILY MEDICINE CLINIC | Facility: CLINIC | Age: 76
End: 2021-01-11

## 2021-01-11 DIAGNOSIS — K12.1 DENTURE SORE MOUTH: Primary | ICD-10-CM

## 2021-01-14 RX ORDER — METOPROLOL TARTRATE 100 MG/1
TABLET ORAL
Qty: 90 TABLET | Refills: 0 | Status: SHIPPED | OUTPATIENT
Start: 2021-01-14

## 2021-01-15 ENCOUNTER — TELEPHONE (OUTPATIENT)
Dept: FAMILY MEDICINE CLINIC | Facility: CLINIC | Age: 76
End: 2021-01-15

## 2021-01-15 RX ORDER — LOSARTAN POTASSIUM 100 MG/1
100 TABLET ORAL DAILY
Qty: 30 TABLET | Refills: 0 | Status: SHIPPED | OUTPATIENT
Start: 2021-01-15

## 2021-01-15 NOTE — TELEPHONE ENCOUNTER
Called and left message for patient:    Farhat Carrillo 13 Clinical Staff             Good Morning,     Dental providerIker Purchase is out of network with patients insurance plan. Referral will be closed at this time.      Referral can be reope

## 2021-01-15 NOTE — TELEPHONE ENCOUNTER
Wanda jimenez [facility] sent to [address] [place] [address] until mail order is received.

## 2021-01-15 NOTE — TELEPHONE ENCOUNTER
losartan 100 MG Oral Tab    Patient did not receive her refill from [facility] and is requesting a 30 days supply to her local pharmacy.     Please send call into Yan[facility] [phone]

## 2021-01-19 ENCOUNTER — TELEPHONE (OUTPATIENT)
Dept: FAMILY MEDICINE CLINIC | Facility: CLINIC | Age: 76
End: 2021-01-19

## 2021-01-26 ENCOUNTER — VIRTUAL PHONE E/M (OUTPATIENT)
Dept: FAMILY MEDICINE CLINIC | Facility: CLINIC | Age: 76
End: 2021-01-26
Payer: MEDICARE

## 2021-01-26 DIAGNOSIS — Z91.89 AT INCREASED RISK OF EXPOSURE TO COVID-19 VIRUS: Primary | ICD-10-CM

## 2021-01-26 DIAGNOSIS — Z23 NEED FOR VACCINATION: ICD-10-CM

## 2021-01-26 PROCEDURE — 99213 OFFICE O/P EST LOW 20 MIN: CPT | Performed by: FAMILY MEDICINE

## 2021-01-26 NOTE — PROGRESS NOTES
HPI:    Patient ID: Domonique Watson is a 67year old female. Pt states that she traveled on a train on 1/27/21 and would like to know if she needs to complete COVID-19 testing. She had no known exposures while traveling. Pt is currently asymptomatic.  Zeynep Genao WHEEZING. 25.5 g 0   • LATANOPROST 0.005 % Ophthalmic Solution INSTILL ONE DROP INTO EACH EYE ONCE DAILY. 7.5 mL 1   • Meloxicam 7.5 MG Oral Tab Take 7.5 mg by mouth daily as needed.      • Accu-Chek Softclix Lancets Does not apply Misc Test twice daily 200

## 2021-01-27 ENCOUNTER — TELEPHONE (OUTPATIENT)
Dept: FAMILY MEDICINE CLINIC | Facility: CLINIC | Age: 76
End: 2021-01-27

## 2021-01-27 NOTE — TELEPHONE ENCOUNTER
Patient is calling requesting for a handicap placard form be completed and mailed to her. Please advise when completed and mailed.

## 2021-02-10 ENCOUNTER — TELEPHONE (OUTPATIENT)
Dept: FAMILY MEDICINE CLINIC | Facility: CLINIC | Age: 76
End: 2021-02-10

## 2021-02-10 ENCOUNTER — IMMUNIZATION (OUTPATIENT)
Dept: LAB | Age: 76
End: 2021-02-10
Attending: HOSPITALIST
Payer: MEDICARE

## 2021-02-10 DIAGNOSIS — M16.11 PRIMARY OSTEOARTHRITIS OF RIGHT HIP: Primary | ICD-10-CM

## 2021-02-10 DIAGNOSIS — N18.2 TYPE 2 DIABETES MELLITUS WITH STAGE 2 CHRONIC KIDNEY DISEASE, UNSPECIFIED WHETHER LONG TERM INSULIN USE (HCC): ICD-10-CM

## 2021-02-10 DIAGNOSIS — E11.22 TYPE 2 DIABETES MELLITUS WITH STAGE 2 CHRONIC KIDNEY DISEASE, UNSPECIFIED WHETHER LONG TERM INSULIN USE (HCC): ICD-10-CM

## 2021-02-10 DIAGNOSIS — M54.41 ACUTE BACK PAIN WITH SCIATICA, RIGHT: ICD-10-CM

## 2021-02-10 DIAGNOSIS — Z23 NEED FOR VACCINATION: Primary | ICD-10-CM

## 2021-02-10 PROCEDURE — 0001A SARSCOV2 VAC 30MCG/0.3ML IM: CPT

## 2021-02-10 NOTE — TELEPHONE ENCOUNTER
[facility] appt [facility] tomorrow to get her pain medication.   They are telling her she needs another referral

## 2021-02-10 NOTE — TELEPHONE ENCOUNTER
Spoke with patient, she is requesting referral for Dr. Elizabeth Rivera, I referenced tele enc from 10/8/29123, phone number and information:  Libby Hernandez, [address]  [facility] [phone]     Patient will call Dr. Pepito Castro for an jose luis

## 2021-02-15 NOTE — TELEPHONE ENCOUNTER
Dr. Sukhdeep Sorto: authorized. Dr. Mejia Dry: closed: out of network. Attempted to reach patient, No answer, VM is full.

## 2021-03-03 ENCOUNTER — IMMUNIZATION (OUTPATIENT)
Dept: LAB | Age: 76
End: 2021-03-03
Attending: HOSPITALIST
Payer: MEDICARE

## 2021-03-03 DIAGNOSIS — Z23 NEED FOR VACCINATION: Primary | ICD-10-CM

## 2021-03-03 PROCEDURE — 0002A SARSCOV2 VAC 30MCG/0.3ML IM: CPT

## 2021-03-04 DIAGNOSIS — I10 ESSENTIAL HYPERTENSION: ICD-10-CM

## 2021-03-04 RX ORDER — POTASSIUM CHLORIDE 750 MG/1
TABLET, EXTENDED RELEASE ORAL
Qty: 180 TABLET | Refills: 0 | Status: SHIPPED | OUTPATIENT
Start: 2021-03-04

## 2021-03-04 NOTE — TELEPHONE ENCOUNTER
Rx Request  Potassium Chloride 10 meq    Disp:     180               R: 0    Last Refilled: 12/04/2020     Last Visit: 01/26/2021

## 2021-03-05 ENCOUNTER — TELEPHONE (OUTPATIENT)
Dept: FAMILY MEDICINE CLINIC | Facility: CLINIC | Age: 76
End: 2021-03-05

## 2021-03-05 DIAGNOSIS — M16.11 ARTHRITIS OF RIGHT HIP: ICD-10-CM

## 2021-03-05 DIAGNOSIS — M25.551 HIP PAIN, CHRONIC, RIGHT: Primary | ICD-10-CM

## 2021-03-05 DIAGNOSIS — G89.29 HIP PAIN, CHRONIC, RIGHT: Primary | ICD-10-CM

## 2021-03-05 NOTE — TELEPHONE ENCOUNTER
Pt walks with walker and the wires are coming out on both handles and it is poking your hands. She needs a referral from Dr Mayuri Carrillo it needs to be sent to [facility] equipment department. threw [employer] She does not have a number for them.

## 2021-03-10 DIAGNOSIS — M96.1 POSTLAMINECTOMY SYNDROME, LUMBAR REGION: ICD-10-CM

## 2021-03-10 RX ORDER — CYCLOBENZAPRINE HCL 10 MG
10 TABLET ORAL 3 TIMES DAILY
Qty: 270 TABLET | Refills: 0 | Status: SHIPPED | OUTPATIENT
Start: 2021-03-10 | End: 2021-05-12

## 2021-03-10 NOTE — TELEPHONE ENCOUNTER
Pt was advised by vera they do not have rolling walkers. She will need order to go to another medical supply facility. pls advise and pt wants us to note she lives in [place].

## 2021-03-11 ENCOUNTER — TELEPHONE (OUTPATIENT)
Dept: FAMILY MEDICINE CLINIC | Facility: CLINIC | Age: 76
End: 2021-03-11

## 2021-03-11 NOTE — TELEPHONE ENCOUNTER
Last chest CT done 3/12/20 for lung nodules. Please advise if you'd like reordered - does not appear to be done annually, looking back over chart. Thank you.

## 2021-03-11 NOTE — TELEPHONE ENCOUNTER
Spoke to pt; she states she called [facility] [address] in [place] and they said they would bill her insurance. Told pt we will try to order from them and will let her know what they say.    Spoke to Ria at [facility], they do not provide medical equi

## 2021-03-12 ENCOUNTER — TELEPHONE (OUTPATIENT)
Dept: FAMILY MEDICINE CLINIC | Facility: CLINIC | Age: 76
End: 2021-03-12

## 2021-03-12 NOTE — TELEPHONE ENCOUNTER
Pt calling to say that [employer]. We need to send an auth to [facility] per Patient. Katherine Givens with seat on it. She has been speaking with one of our nurses. Pls call her back to advise.

## 2021-03-12 NOTE — TELEPHONE ENCOUNTER
Spoke to pt, states she spoke to her insurance and was told the walker will be covered, however she will be responsible for 10%. Pt is okay with paying the 10%.   She would like [address] [place] [address] to call her insurance [phone] to get the approval    S

## 2021-03-15 ENCOUNTER — TELEPHONE (OUTPATIENT)
Dept: FAMILY MEDICINE CLINIC | Facility: CLINIC | Age: 76
End: 2021-03-15

## 2021-03-15 NOTE — TELEPHONE ENCOUNTER
Pt calling back to ask about her walker order status. She dis have an appt today at 11:30am with CZ to discuss walker, however, she did not show and states we told her she did not need an appointment for this issue.       Pls call her and let her know th

## 2021-03-15 NOTE — TELEPHONE ENCOUNTER
Pt informed, insurance will not cover rolling walker. Pt will have to pay out of pocket for walker. Pt verbalized understanding.

## 2021-03-19 ENCOUNTER — OFFICE VISIT (OUTPATIENT)
Dept: FAMILY MEDICINE CLINIC | Facility: CLINIC | Age: 76
End: 2021-03-19
Payer: MEDICARE

## 2021-03-19 ENCOUNTER — TELEPHONE (OUTPATIENT)
Dept: FAMILY MEDICINE CLINIC | Facility: CLINIC | Age: 76
End: 2021-03-19

## 2021-03-19 VITALS
SYSTOLIC BLOOD PRESSURE: 152 MMHG | DIASTOLIC BLOOD PRESSURE: 90 MMHG | HEART RATE: 94 BPM | RESPIRATION RATE: 18 BRPM | OXYGEN SATURATION: 98 %

## 2021-03-19 DIAGNOSIS — M16.11 PRIMARY OSTEOARTHRITIS OF RIGHT HIP: ICD-10-CM

## 2021-03-19 DIAGNOSIS — M54.16 LUMBAR RADICULITIS: Primary | ICD-10-CM

## 2021-03-19 DIAGNOSIS — M16.11 ARTHRITIS OF RIGHT HIP: ICD-10-CM

## 2021-03-19 DIAGNOSIS — M25.551 CHRONIC RIGHT HIP PAIN: ICD-10-CM

## 2021-03-19 DIAGNOSIS — G89.29 CHRONIC RIGHT HIP PAIN: ICD-10-CM

## 2021-03-19 PROCEDURE — 3077F SYST BP >= 140 MM HG: CPT | Performed by: FAMILY MEDICINE

## 2021-03-19 PROCEDURE — 99213 OFFICE O/P EST LOW 20 MIN: CPT | Performed by: FAMILY MEDICINE

## 2021-03-19 PROCEDURE — 3080F DIAST BP >= 90 MM HG: CPT | Performed by: FAMILY MEDICINE

## 2021-03-19 RX ORDER — HYDROCODONE BITARTRATE AND ACETAMINOPHEN 5; 325 MG/1; MG/1
1 TABLET ORAL EVERY 12 HOURS PRN
Qty: 60 TABLET | Refills: 0 | Status: SHIPPED | OUTPATIENT
Start: 2021-03-19

## 2021-03-19 NOTE — PROGRESS NOTES
Patient with osteoarthritis of the right hip. She has been told she needs a hip replacement but feels she is too old to do it. She has lumbar back pain with postlaminectomy syndrome. She had previously been seeing Dr. Anastacio Cheatham out of [facility]. • KNEE REPLACEMENT SURGERY      left knee, then several surgeries after that due to a fall & infection   • LAPAROSCOPIC CHOLECYSTECTOMY N/A 12/21/2016    Performed by Mavis Mackey MD at [facility] OR   • AKI BIOPSY STEREO NODULE 2 SITE BILAT (CPT=19081/19 USE AS DIRECTED. 200 strip 3   • Venlafaxine HCl  MG Oral Tablet 24 Hr Take 1 tablet (150 mg total) by mouth daily. 90 tablet 3   • HYDROcodone-acetaminophen  MG Oral Tab Take 1 tablet by mouth 3 (three) times daily as needed.      • atorvastati looking for someone who will sedate her to do the injections in the hip as the injections have been quite painful to her in the past.    If this note is coded by time based on the Office/Outpatient Evaluation and Management Codes effective January 1, 2021,

## 2021-03-19 NOTE — TELEPHONE ENCOUNTER
Spoke to insurance regarding patient getting a new Rollator walker since her walker is broken. Insurance gave me a Authorization approval for the walker ([id number]). Insurance told me to fax the walker order to [address] in [place].  Order has been faxed

## 2021-03-22 ENCOUNTER — TELEPHONE (OUTPATIENT)
Dept: FAMILY MEDICINE CLINIC | Facility: CLINIC | Age: 76
End: 2021-03-22

## 2021-03-22 NOTE — TELEPHONE ENCOUNTER
Pt c/o \"bumps all over face like measles\" and achy in all joints. Sx have been present for couple of weeks. Pt had covid vax on 3/3/21 and states these sx have been present since then. She was in on Friday, but said she forgot to mention it.  Has not us

## 2021-03-22 NOTE — TELEPHONE ENCOUNTER
I added that something that started 3 weeks ago was still present for an allergic rash. Could treat for allergy with Zyrtec 10 mg twice daily for 10 days.   If she develops shortness of breath, difficulty swallowing, or cough; then she should go to the ER

## 2021-04-05 ENCOUNTER — TELEPHONE (OUTPATIENT)
Dept: FAMILY MEDICINE CLINIC | Facility: CLINIC | Age: 76
End: 2021-04-05

## 2021-04-05 NOTE — TELEPHONE ENCOUNTER
Pt was in on 3/19/21 and it was pt reported that she had Rx for Norco 10/325s. It is in office notes that pt had not taken Norco for some time. She is under the impression that we sent the wrong Rx for her because she wants 10mg, not 5s which were sent.

## 2021-04-05 NOTE — TELEPHONE ENCOUNTER
PT STATES CZ CALLED IN [place] [facility] FOR HER AND SHE USUALLY GETS 10MG. SHE IS ASKING FOR THE 10MG BECAUSE SHE IS IN SO MUCH PAIN. PLS CALL HER BACK TO [facility]. Dexter NELSON IN [facility]. SHE WANTS THE 10 MG NOT 5 MG.     PT UPSET THAT C

## 2021-04-05 NOTE — TELEPHONE ENCOUNTER
Safer to start with the lower dose to avoid an overdose on the 10 mg tablets. Yes, should follow-up with Dr. Gillian Anderson.

## 2021-04-13 ENCOUNTER — TELEPHONE (OUTPATIENT)
Dept: FAMILY MEDICINE CLINIC | Facility: CLINIC | Age: 76
End: 2021-04-13

## 2021-04-13 NOTE — TELEPHONE ENCOUNTER
Rhianna Muniz, \[facility] [place] [facility]\""W routed conversation to [facility] Clinical Staff 2 hours ago (11:39 AM)   [facility], LCSW 2 hours ago (11:38 AM)   SM     Received voicemail from patient stating she has a $40 bill from an order for a walker, and would like to discuss. Will forward to [facility] staff. Spoke with patient she states this is not the bill for the walker, this is a no show bill for appt on 3/15/2021, she states she did not make this appt and wants bill waived.    Gisela Leung, please see patient request.

## 2021-04-29 ENCOUNTER — HOSPITAL ENCOUNTER (OUTPATIENT)
Dept: CT IMAGING | Facility: HOSPITAL | Age: 76
Discharge: HOME | End: 2021-04-29
Attending: FAMILY MEDICINE
Payer: MEDICARE

## 2021-04-29 DIAGNOSIS — R91.1 LUNG NODULE: ICD-10-CM

## 2021-04-29 PROCEDURE — 71250 CT THORAX DX C-: CPT | Performed by: FAMILY MEDICINE

## 2021-05-12 DIAGNOSIS — M96.1 POSTLAMINECTOMY SYNDROME, LUMBAR REGION: ICD-10-CM

## 2021-05-12 RX ORDER — CYCLOBENZAPRINE HCL 10 MG
10 TABLET ORAL 3 TIMES DAILY
Qty: 270 TABLET | Refills: 0 | Status: SHIPPED | OUTPATIENT
Start: 2021-05-12 | End: 2021-08-27

## 2021-08-26 DIAGNOSIS — M96.1 POSTLAMINECTOMY SYNDROME, LUMBAR REGION: ICD-10-CM

## 2021-08-27 RX ORDER — CYCLOBENZAPRINE HCL 10 MG
TABLET ORAL
Qty: 270 TABLET | Refills: 0 | Status: SHIPPED | OUTPATIENT
Start: 2021-08-27 | End: 2021-10-26

## 2021-10-23 DIAGNOSIS — M96.1 POSTLAMINECTOMY SYNDROME, LUMBAR REGION: ICD-10-CM

## 2021-10-26 RX ORDER — CYCLOBENZAPRINE HCL 10 MG
TABLET ORAL
Qty: 270 TABLET | Refills: 0 | Status: SHIPPED | OUTPATIENT
Start: 2021-10-26 | End: 2021-12-30

## 2021-12-30 DIAGNOSIS — M96.1 POSTLAMINECTOMY SYNDROME, LUMBAR REGION: ICD-10-CM

## 2021-12-30 RX ORDER — CYCLOBENZAPRINE HCL 10 MG
TABLET ORAL
Qty: 270 TABLET | Refills: 0 | Status: SHIPPED | OUTPATIENT
Start: 2021-12-30

## 2022-02-27 NOTE — TELEPHONE ENCOUNTER
Referrals faxed to [facility]. Patient informed. [facility]  - [facility] [place]

## 2022-03-14 RX ORDER — CYCLOBENZAPRINE HCL 10 MG
TABLET ORAL
Qty: 270 TABLET | Refills: 0 | Status: SHIPPED | OUTPATIENT
Start: 2022-03-14

## 2022-06-16 DIAGNOSIS — M96.1 POSTLAMINECTOMY SYNDROME, LUMBAR REGION: ICD-10-CM

## 2022-06-16 RX ORDER — CYCLOBENZAPRINE HCL 10 MG
TABLET ORAL
Qty: 270 TABLET | Refills: 0 | OUTPATIENT
Start: 2022-06-16

## 2023-11-27 NOTE — TELEPHONE ENCOUNTER
Patient would like to discuss recent labs and regarding nodule in lung 39 Y/O female with a PMHx of ETOH abuse with lower abdominal wall boil. No leukocytosis. Afebrile.       PLAN:       - Incision and drainage planning for lower abdominal wall boil   - Continue ABx   - Continue care per primary team   - Discussed with Dr. Mak  39 Y/O female with a PMHx of ETOH abuse with lower abdominal wall boil. No leukocytosis. Afebrile.       PLAN:       - Incision and drainage planning for lower abdominal wall boil   - Continue ABx   - Local wound care per nursing  - Continue care per primary team   - No further surgical intervention at this time; please reconsult as needed  - Discussed with Dr. Mak

## 2024-05-06 ENCOUNTER — OFFICE VISIT (OUTPATIENT)
Dept: FAMILY MEDICINE CLINIC | Facility: CLINIC | Age: 79
End: 2024-05-06
Payer: MEDICARE

## 2024-05-06 ENCOUNTER — HOSPITAL ENCOUNTER (OUTPATIENT)
Age: 79
Discharge: HOME | End: 2024-05-06
Attending: EMERGENCY MEDICINE
Payer: MEDICARE

## 2024-05-06 ENCOUNTER — APPOINTMENT (OUTPATIENT)
Dept: GENERAL RADIOLOGY | Age: 79
End: 2024-05-06
Attending: EMERGENCY MEDICINE
Payer: MEDICARE

## 2024-05-06 VITALS
HEIGHT: 67 IN | HEART RATE: 82 BPM | SYSTOLIC BLOOD PRESSURE: 118 MMHG | DIASTOLIC BLOOD PRESSURE: 67 MMHG | OXYGEN SATURATION: 97 % | RESPIRATION RATE: 18 BRPM | BODY MASS INDEX: 30.92 KG/M2 | WEIGHT: 197 LBS | TEMPERATURE: 98 F

## 2024-05-06 VITALS
WEIGHT: 197 LBS | OXYGEN SATURATION: 96 % | DIASTOLIC BLOOD PRESSURE: 64 MMHG | SYSTOLIC BLOOD PRESSURE: 142 MMHG | RESPIRATION RATE: 18 BRPM | TEMPERATURE: 98 F | HEIGHT: 66 IN | HEART RATE: 93 BPM | BODY MASS INDEX: 31.66 KG/M2

## 2024-05-06 DIAGNOSIS — M25.531 RIGHT WRIST PAIN: Primary | ICD-10-CM

## 2024-05-06 DIAGNOSIS — M25.531 WRIST PAIN, ACUTE, RIGHT: ICD-10-CM

## 2024-05-06 DIAGNOSIS — Z02.9 ADMINISTRATIVE ENCOUNTER: Primary | ICD-10-CM

## 2024-05-06 PROCEDURE — 99203 OFFICE O/P NEW LOW 30 MIN: CPT

## 2024-05-06 PROCEDURE — 73110 X-RAY EXAM OF WRIST: CPT | Performed by: EMERGENCY MEDICINE

## 2024-05-06 RX ORDER — SEMAGLUTIDE 0.68 MG/ML
0.5 INJECTION, SOLUTION SUBCUTANEOUS WEEKLY
COMMUNITY
Start: 2024-04-19

## 2024-05-06 RX ORDER — POTASSIUM CHLORIDE 750 MG/1
10 TABLET, FILM COATED, EXTENDED RELEASE ORAL 2 TIMES DAILY
COMMUNITY
Start: 2024-03-07

## 2024-05-06 RX ORDER — GLIPIZIDE 10 MG/1
10 TABLET ORAL
COMMUNITY
Start: 2023-09-11

## 2024-05-06 RX ORDER — OMEPRAZOLE 40 MG/1
40 CAPSULE, DELAYED RELEASE ORAL DAILY
COMMUNITY
Start: 2024-04-29

## 2024-05-06 RX ORDER — LOSARTAN POTASSIUM AND HYDROCHLOROTHIAZIDE 25; 100 MG/1; MG/1
1 TABLET ORAL DAILY
COMMUNITY
Start: 2023-02-15

## 2024-05-06 RX ORDER — CARVEDILOL 25 MG/1
25 TABLET ORAL 2 TIMES DAILY WITH MEALS
COMMUNITY
Start: 2023-03-08

## 2024-05-06 NOTE — ED INITIAL ASSESSMENT (HPI)
Patient here for evaluation of right wrist pain and swelling that started about 1 week ago. Patient denies any injury or trauma and states she is right handed. States she has used Norco PRN that she typically takes for her back pain but does have some relief with the wrist pain.

## 2024-05-06 NOTE — PROGRESS NOTES
Samina Genao is a 78 year old female who presents to [facility] with c/o right wrist swelling/pain x 3 days.  Worsening over course, pain with ROM/weight bearing.  No recalled injury, however states she often bumps wrist against doors while using her walker.  R hand dominant, h/o osteopenia per previous DEXA results from 2015.   H/o OA chiquita hands.   Denies paresthesias/weakness, no h/o recent falls.   On norco for unrelated chronic pain, followed by pain management, reports norco helps some with discomfort.   Ice without relief.     Adhesive tape, Metformin, and Statins  Current Outpatient Medications   Medication Sig Dispense Refill    CYCLOBENZAPRINE 10 MG Oral Tab TAKE 1 TABLET THREE TIMES DAILY 270 tablet 0    HYDROcodone-acetaminophen 5-325 MG Oral Tab Take 1 tablet by mouth every 12 (twelve) hours as needed for Pain. 60 tablet 0    POTASSIUM CHLORIDE ER 10 MEQ Oral Tab CR TAKE 1 TABLET TWICE DAILY 180 tablet 0    losartan 100 MG Oral Tab Take 1 tablet (100 mg total) by mouth daily. 30 tablet 0    Metoprolol Tartrate 100 MG Oral Tab TAKE 1 TABLET EVERY DAY 90 tablet 0    PANTOPRAZOLE SODIUM 40 MG Oral Tab EC TAKE 1 TABLET TWICE DAILY BEFORE MEALS 180 tablet 0    SUMATRIPTAN SUCCINATE 50 MG Oral Tab TAKE 1 TABLET EVERY 2 HOURS AS NEEDED FOR MIGRAINE AS DIRECTED 9 tablet 0    Travoprost 0.004 % Ophthalmic Solution Apply 1 drop to eye daily.      glimepiride 4 MG Oral Tab Take 1 tablet (4 mg total) by mouth every morning before breakfast. 90 tablet 0    ALBUTEROL SULFATE  (90 Base) MCG/ACT Inhalation Aero Soln INHALE ONE TO TWO PUFFS EVERY 6 HOURS AS NEEDED FOR WHEEZING. 25.5 g 0    LATANOPROST 0.005 % Ophthalmic Solution INSTILL ONE DROP INTO EACH EYE ONCE DAILY. 7.5 mL 1    Meloxicam 7.5 MG Oral Tab Take 7.5 mg by mouth daily as needed.      Accu-Chek Softclix Lancets Does not apply Misc Test twice daily 200 each 3    Glucose Blood (ACCU-CHEK BRIANNA PLUS) In Vitro Strip TEST TWICE A DAY OR USE AS DIRECTED. 200  strip 3    Venlafaxine HCl  MG Oral Tablet 24 Hr Take 1 tablet (150 mg total) by mouth daily. 90 tablet 3    HYDROcodone-acetaminophen  MG Oral Tab Take 1 tablet by mouth 3 (three) times daily as needed.      atorvastatin 80 MG Oral Tab Take 80 mg by mouth.      Lancets 30G Does not apply Misc 1 each by Does not apply route.      Blood Glucose Monitoring Suppl (ACCU-CHEK BRIANNA PLUS) w/Device Does not apply Kit       furosemide 40 MG Oral Tab Take 1 tablet (40 mg total) by mouth daily. 90 tablet 3    GABAPENTIN 800 MG Oral Tab TAKE 1 TABLET BY MOUTH THREE TIMES DAILY 270 tablet 0    Albuterol Sulfate (2.5 MG/3ML) 0.083% Inhalation Nebu Soln Take 3 mL (2.5 mg total) by nebulization every 4 (four) hours as needed for Wheezing. 3 Box 3     ROS  See HPI  All others reported negative.     OBJECTIVE:   /67   Pulse 82   Temp 98.1 °F (36.7 °C) (Oral)   Resp 18   Ht 5' 7\" (1.702 m)   Wt 197 lb (89.4 kg)   SpO2 97%   BMI 30.85 kg/m²   GENERAL A & O X 3 in nad, pleasant, comfortable, ambulatory with walker.   MS:  (+) edema with ttp over ulnar aspect of R wrist, pain reproduced with flexion/extension at wrist and supination.  Mild pain with lateral compression ulna/radius, No crepitus, no elbow/proximal ulnar ttp, sensation intact, radial pulse 2+.         ICD-10-CM    1. Administrative encounter  Z02.9       2. Wrist pain, acute, right  M25.531         Referred to IC given acute onset with point ttp over distal ulna.  Discussed ddx including possible overuse/strain, however given h/o osteopenia in 2015 in addition to potential injury recommend r/o fx.  Pt voiced understanding and agreement with plan of care, referrred to IC.  Report given to  IC physician, pt discharged in stable condition via private car.     atient/parent verbalized understanding of rationale for further evaluation and was stable upon discharge.  Electronically signed,   Dasha Oliva PA-C,  5/6/2024, 11:11 AM

## 2024-05-06 NOTE — ED PROVIDER NOTES
Patient Seen in: [facility]      History     Chief Complaint   Patient presents with    Wrist Pain     Stated Complaint: Wrist Injury    Subjective:   HPI    Patient presents with wrist pain.  The patient states that the pain started last week without any injury.  She subsequently developed some swelling to the wrist.  She has taken Norco for the pain with some improvement.  She takes Norco for other orthopedic issues.  She denies any redness or increased warmth to the wrist.  She can move her fingers normally and denies numbness.  She does walk exclusively with a walker.    Objective:   Past Medical History:    Asthma (HCC)    Back pain    Back problem    2 screws in low back    Calcified granuloma of lung (HCC)    PET scan 11/2015     Esophageal reflux    Glaucoma    Hyperlipidemia    Migraines    Neuropathy    bilateral legs    Orbit injury, right    Ct 8/2/2017    Osteoarthrosis, unspecified whether generalized or localized, unspecified site    generalized    Pulmonary nodules    Pet scan: 11/15/2015  Benign granulomas    Status post revision of total replacement of left knee    Type II or unspecified type diabetes mellitus without mention of complication, not stated as uncontrolled    Unspecified essential hypertension    Visual impairment    glasses              Past Surgical History:   Procedure Laterality Date    Appendectomy      Back surgery      x6     Back surgery      x 2 neck    Colonoscopy  12/14/2020    Chuck,  no polyps.  there were diverticula    Hysterectomy      Knee replacement surgery      left knee, then several surgeries after that due to a fall & infection    Laparoscopic cholecystectomy N/A 12/21/2016    Procedure: LAPAROSCOPIC CHOLECYSTECTOMY;  Surgeon: Johnny Dailey MD;  Location: [facility] MAIN OR    Aljeandro biopsy stereo nodule 2 site bilat (cpt=19081/24315)  2008 11/16'    Benign/BENIGN    Needle biopsy left      Other surgical history      stimulator,spine x 2    Other  surgical history      right heal spur    Other surgical history      bilateral carpal tunnel    Tonsillectomy      Total knee replacement      right                Social History     Socioeconomic History    Marital status:    Tobacco Use    Smoking status: Never    Smokeless tobacco: Never   Vaping Use    Vaping status: Never Used   Substance and Sexual Activity    Alcohol use: Not Currently     Alcohol/week: 0.0 standard drinks of alcohol     Comment: 1 x a year    Drug use: No   Other Topics Concern    Caffeine Concern No     Comment: 1 cup occasionally    Exercise No     Social Determinants of Health      Received from [facility]              Review of Systems    Positive for stated complaint: Wrist Injury  Other systems are as noted in HPI.  Constitutional and vital signs reviewed.      All other systems reviewed and negative except as noted above.    Physical Exam     ED Triage Vitals [05/06/24 1203]   /64   Pulse 93   Resp 18   Temp 97.8 °F (36.6 °C)   Temp src Temporal   SpO2 96 %   O2 Device None (Room air)       Current:/64   Pulse 93   Temp 97.8 °F (36.6 °C) (Temporal)   Resp 18   Ht 167.6 cm (5' 6\")   Wt 89.4 kg   SpO2 96%   BMI 31.80 kg/m²         Physical Exam    General: Awake and alert in no acute distress.  Extremity: Mild generalized tenderness over the dorsum of the wrist with mild associated edema, normal ROM of wrist and fingers, no overlying erythema or increased warmth to palpation, radial pulse plus 2 out of 4.  Skin: No bruising or abrasions.  Neuro: Distal sensory and motor exams intact.    ED Course   Labs Reviewed - No data to display        XR WRIST COMPLETE (MIN 3 VIEWS), RIGHT (CPT=73110)    Result Date: 5/6/2024  PROCEDURE:  XR WRIST COMPLETE (MIN 3 VIEWS), RIGHT (CPT=73110)  TECHNIQUE:  Three views were obtained.  COMPARISON:  None.  INDICATIONS:  Wrist Injury  PATIENT STATED HISTORY: (As transcribed by Technologist)  Right, medial wrist  pain and swelling. No trauma.    FINDINGS:  BONES:  There is no acute fracture.  There is joint space narrowing and marginal osteophyte formation of the radiocarpal joint and all of the carpometacarpal joints. SOFT TISSUES:  Negative.  No visible soft tissue swelling. EFFUSION:  There is no joint effusion detected although there is chondrocalcinosis. OTHER:  Negative.            CONCLUSION:  1. There is no acute fracture. 2. There is chondrocalcinosis. 3. There is osteoarthritis in the wrist.   LOCATION:  [facility]   Dictated by (CST): Mayo Lafleur MD on 5/06/2024 at 12:41 PM     Finalized by (CST): Mayo Lafleur MD on 5/06/2024 at 12:42 PM        Patient was placed in a splint.       MDM      Patient presents with wrist pain.  The patient does have evidence of arthritis on x-ray without effusion or fracture.  The patient does not have an exam consistent with septic arthritis or gouty arthritis.  I feel that this is likely an exacerbation of osteoarthritis possibly from repetitive use with using her walker.  She was placed in a splint and was counseled that she may be able to take a limited amount of NSAIDs on a short-term basis for further pain relief.  She will be given orthopedic follow-up.                           Medical Decision Making      Disposition and Plan     Clinical Impression:  1. Right wrist pain         Disposition:  Discharge  5/6/2024 12:53 pm    Follow-up:  Maico Granados MD  [address] [place] [id number]  [phone]    Call in 1 day            Medications Prescribed:  Current Discharge Medication List

## (undated) DIAGNOSIS — M96.1 POSTLAMINECTOMY SYNDROME, LUMBAR REGION: ICD-10-CM

## (undated) DIAGNOSIS — G89.4 CHRONIC PAIN SYNDROME: Primary | ICD-10-CM

## (undated) DEVICE — KENDALL SCD EXPRESS SLEEVES, KNEE LENGTH, MEDIUM: Brand: KENDALL SCD

## (undated) DEVICE — Device

## (undated) DEVICE — SOL  .9 3000ML

## (undated) DEVICE — ZIPWIRE GUIDEWIRE .035X150 STR

## (undated) DEVICE — SOL  .9 1000ML BTL

## (undated) DEVICE — GLOVE SURG TRIUMPH SZ 71/2

## (undated) DEVICE — CYSTO CDS-LF: Brand: MEDLINE INDUSTRIES, INC.

## (undated) DEVICE — OPEN-END FLEXI-TIP URETERAL CATHETER: Brand: FLEXI-TIP

## (undated) NOTE — MR AVS SNAPSHOT
[address]  [address] [address] [phone]               Thank you for choosing us for your health care visit with Fred Chinchilla DO.   We are glad to serve you and happy to provide you with this garcia tablet by mouth three times daily   Commonly known as:  cyclobenzaprine           Fenofibrate 48 MG Tabs   Take 1 tablet (48 mg total) by mouth daily.    Commonly known as:  TRICOR           gabapentin 800 MG Tabs   Take 1 tablet (800 mg total) by mouth 3 ( Take 1 capsule (150 mg total) by mouth daily. Commonly known as:  EFFEXOR-XR           * Notice: This list has 3 medication(s) that are the same as other medications prescribed for you.  Read the directions carefully, and ask your doctor or other care pr

## (undated) NOTE — MR AVS SNAPSHOT
[address]  [address] [facility], [address] [id number] [phone]               Thank you for choosing us for your health care visit with Jane Dominguez MD.  We are glad to serve you and happy to provide you with this ACCU-CHEK BRIANNA PLUS Strp   Generic drug:  Glucose Blood   TEST TWICE A DAY OR USE AS DIRECTED. ACCU-CHEK BRIANNA PLUS w/Device Kit   1 Device by Other route 2 (two) times daily.            ACCU-CHEK SOFTCLIX LANCETS Misc   1 lancet by Finger stick SUMAtriptan Succinate 50 MG Tabs   Take 1 tablet (50 mg total) by mouth every 2 (two) hours as needed for Migraine. Commonly known as:  IMITREX           topiramate 100 MG Tabs   Take 1 tablet (100 mg total) by mouth 2 (two) times daily.  Migraine preven

## (undated) NOTE — Clinical Note
2017    Patient: Rafael Mcclain  : 1945 Visit date: 2017    Dear  Dr. Karine Jacobson MD,    Thank you for referring Rafael Mcclain to my practice. Please find my assessment and plan below.         Assessment   Calculus of gallbladder without c She was relieved with these results. The patient continues to have difficulties with her bowels. At today's office visit we discussed pursuing further medical management versus allowing her body to acclimate postoperatively.   The patient wishes to refra

## (undated) NOTE — Clinical Note
2017    Patient: Abbie   : 1945 Visit date: 1/3/2017    Dear  Dr. Judit Rhodes MD,    Thank you for referring Abbie Canela to my practice. Please find my assessment and plan below.         Assessment   Postoperative bile leak  (primary

## (undated) NOTE — LETTER
May 8, 2020     [address] [place] [address]    To whomever it may concern: The above patient has mobility issues secondary to diabetic neuropathy and osteoarthritis of the hip.   She must move about with a sco

## (undated) NOTE — MR AVS SNAPSHOT
[address]  [address] [place] [id number] [phone]               Thank you for choosing us for your health care visit with Ange Quintana MD.  We are glad to serve you and happy to provide you with this 1 Device by Other route 2 (two) times daily. ACCU-CHEK SOFTCLIX LANCETS Misc   1 lancet by Finger stick route 2 (two) times daily. Use as directed.            albuterol sulfate (2.5 MG/3ML) 0.083% Nebu   Take 3 mL (2.5 mg total) by nebulization ev Metoprolol-Hydrochlorothiazide 100-25 MG Tabs   Take 1 tablet by mouth daily. Nebulizer Misc   Nebulize with albuterol, 0.083%, 3 mL every 4-6 hours as needed           Omeprazole 40 MG Cpdr   Take 1 capsule (40 mg total) by mouth daily.

## (undated) NOTE — MR AVS SNAPSHOT
[address]  [address] [address]  [phone]               Thank you for choosing us for your health care visit with Ange Quintana MD.  We are glad to serve you and happy to provide you with this Plantar fasciitis is a painful swelling of the plantar fascia. The plantar fascia is a thick, fibrous layer of tissue. It covers the bones on the bottom of your foot. And it supports the foot bones in an arched position.   This can happen gradually or sudde feet. Gently flex your ankle so the toes move toward your knee. · Icing may help control heel pain. Apply an ice pack to the heel for 10-20 minutes as a preventive. Or ice your heel after a severe flare-up of symptoms.  You may repeat this every 1-2 hours ACCU-CHEK BRIANNA PLUS w/Device Kit   1 Device by Other route 2 (two) times daily. ACCU-CHEK SOFTCLIX LANCETS Misc   1 lancet by Finger stick route 2 (two) times daily. Use as directed.            albuterol sulfate (2.5 MG/3ML) 0.083% Nebu   Take 3 Nebulize with albuterol, 0.083%, 3 mL every 4-6 hours as needed           Omeprazole 40 MG Cpdr   TAKE 1 CAPSULE(40 MG) BY MOUTH DAILY           Pioglitazone HCl 30 MG Tabs   TAKE ONE TABLET BY MOUTH ONCE DAILY   Commonly known as:  Rasheed Esposito

## (undated) NOTE — MR AVS SNAPSHOT
[address]  [address] [facility], [address] [address] [phone]               Thank you for choosing us for your health care visit with German Gold MD.  We are glad to serve you and happy to provide you with this Cyclobenzaprine HCl 10 MG Tabs   Take 1 tablet (10 mg total) by mouth 3 (three) times daily. Take one tablet by mouth three times daily   Commonly known as:  cyclobenzaprine           Fenofibrate 48 MG Tabs   Take 1 tablet (48 mg total) by mouth daily. Take 1 tablet by mouth daily. Venlafaxine HCl  MG Cp24   Take 1 capsule (150 mg total) by mouth daily. Commonly known as:  EFFEXOR-XR           * Notice:   This list has 3 medication(s) that are the same as other medications prescribed for call [facility] Scheduling at [phone]. Wednesday February 15, 2017     Imaging:  XR FOOT, COMPLETE (MIN 3 VIEWS), RIGHT (CPT=73630)    Instructions:   To schedule an appointment for your radiology test please call Patricio Luo

## (undated) NOTE — MR AVS SNAPSHOT
[facility] [address]  [address] [address] [phone]               Thank you for choosing us for your health care visit with Avery Dueñas MD.  We are glad to serve you and happy to provide you wi nondistended, with normoactive bowel sounds. Her incisions are all healed very nicely, they are clean, dry, intact, without erythema or drainage. She has no rebound or guarding. No signs of peritonitis.     At today's office visit I informed the patient Take 3 mL (2.5 mg total) by nebulization every 4 (four) hours as needed for Wheezing. Commonly known as:  VENTOLIN           celecoxib 200 MG Caps   Take 1 capsule (200 mg total) by mouth daily.  Take one capsule by mouth every day   Commonly known as:  C Take 1 tablet (50 mg total) by mouth every 2 (two) hours as needed for Migraine. Commonly known as:  IMITREX           topiramate 100 MG Tabs   Take 1 tablet (100 mg total) by mouth 2 (two) times daily. Migraine prevention   Commonly known as:   Topamax

## (undated) NOTE — IP AVS SNAPSHOT
[facility] [place] [address] [place], [address] ~ [phone]                Discharge Summary   4/17/2017    Rafael Mcclain           Admission Information        Provider Department    4/17/2017 Yong Crook MD  Pre-Op / A TAKE ONE TABLET BY MOUTH ONCE DAILY    Michelle Henriquez     [    ]    [    ]    [    ]    [    ]       Cyclobenzaprine HCl 10 MG Tabs   Commonly known as:  cyclobenzaprine        Take 1 tablet (10 mg total) by mouth 3 (three) times daily.  Take one tablet b Nebulize with albuterol, 0.083%, 3 mL every 4-6 hours as needed    Katja Delacruz     [    ]    [    ]    [    ]    [    ]       Omeprazole 40 MG Cpdr        Take 1 capsule (40 mg total) by mouth daily.     Nancy Lindquist     [    ]    [    ]    [ 3. Some nausea and vomiting is common following anesthesia. Start eating with a  few bites of soft, bland foods, (i.e. jello, soup, 7-up) to see how your stomach will handle them. A milkshake is sometimes helpful in coating the stomach.  If nausea and vomit Immunization History as of 4/17/2017  Never Reviewed    INFLUENZA 9/30/2016, 10/30/2014    Pneumococcal (Prevnar 13) 10/27/2015    Pneumovax 23 (Lot Mgr) 10/30/2014    Zoster (Shingles) 9/8/2016      Recent Hematology Lab Results  (Last 3 results in the pa coverage. Patient [address] is a Federal Navigator program that can help with your Affordable Care Act coverage, as well as all types of Medicaid plans.   To get signed up and covered, please call [phone] and ask to get set up for an insuran

## (undated) NOTE — MR AVS SNAPSHOT
[address]  [address] [address] [phone]               Thank you for choosing us for your health care visit with Irvin Negron MD.  We are glad to serve you and happy to provide you with this Place 5 drops into the right ear 2 (two) times daily. For 3 days   Commonly known as:  DEBROX           celecoxib 200 MG Caps   Take 1 capsule (200 mg total) by mouth daily.  Take one capsule by mouth every day   Commonly known as:  CELEBREX           CloNI Take 1 capsule (150 mg total) by mouth daily.    Commonly known as:  EFFEXOR-XR                   MyChart     Call the [employer] for assistance with your inactive [employer] account    If you have questions, you can call [phone] to talk to our [employer]hart Madden

## (undated) NOTE — LETTER
08/24/20        [address] [place] [address]      Dear Jericho Alonzo,    [facility] records indicate that you have outstanding lab work and or testing that was ordered for you and has not yet been completed:  Orders Placed This Encounter

## (undated) NOTE — LETTER
March 12, 2020    Roddy Yang      Dear Eren Cat: The following are the results of your recent tests. Please review the list of test results.   Your result is the value on the left; we have also supplied the Electrolytes (sodium, potassium, chloride, calcium) are normal or close enough to normal to not require treatment. These often correct with the next meal if you were fasting for the labs. Urine protein level excellent.   Please call if you have further

## (undated) NOTE — LETTER
[facility] Testing Department  Phone: [phone]  Right Fax: [phone]  [address] Kristy Oscar RN Date: 17    Patient Name: Hakeem Tiffany  Surgery Date: 2017    CSN: [id number]  Medical Record: [id number]   :

## (undated) NOTE — MR AVS SNAPSHOT
[address] [facility] [address]  [address] [address] [phone]               Thank you for choosing us for your health care visit with Rupa Metzger MD.  We are glad to serve you and happy to provide you wi This patient is failing to improve after laparoscopic cholecystectomy on December 21, 2016. We will be ordering some laboratory values, and an HIDA scan to look for a biliary leak. She will call our office once the HIDA scan has been completed.   It i Commonly known as:  CATAPRES           Cyclobenzaprine HCl 10 MG Tabs   Take 1 tablet (10 mg total) by mouth 3 (three) times daily.  Take one tablet by mouth three times daily   Commonly known as:  cyclobenzaprine           Dexlansoprazole 60 MG Cpdr   TAKE Valsartan-Hydrochlorothiazide 320-25 MG Tabs   Take 1 tablet by mouth daily. Venlafaxine HCl  MG Cp24   Take 1 capsule (150 mg total) by mouth daily. Commonly known as:  EFFEXOR-XR           * Notice:   This list has 3 medication(s) that

## (undated) NOTE — LETTER
08/07/17    To whomever it may concern: This letter is written for Samina Mackay. Her YOB: 1945. She has made a request for diabetic shoes.   I am treating her for a diagnosis of type 2 diabetes with a comprehensive plan includ

## (undated) NOTE — MR AVS SNAPSHOT
[address]  [address] [place] [id number] [phone]               Thank you for choosing us for your health care visit with Sharyn Ivory MD.  We are glad to serve you and happy to provide you with this Fax:  [phone]             Teresa Gallardo MD   [address] [address]   Phone:  [phone]   Fax:  [phone]    Diagnoses:  Screen for colon cancer   Order:  [facility]con - Internal    Reema Murphy MD   [address] Sterling Larkin schedule your appointment. Failure to obtain required authorization numbers can create reimbursement difficulties for you.     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, macular edema presence unspecified, unspeci Type 2 diabetes mellitus with diabetic polyneuropathy    Type 2 diabetes mellitus with stage 2 chronic kidney disease    Encounter for annual health examination    -  Primary    Screen for colon cancer        Impacted cerumen of right ear          Instruc 02/15/2017 146   05/26/2015 108   05/15/2014 105        EKG - covered if needed at [facility] to Medicare, and non-screening if indicated for medical reasons Electrocardiogram date04/13/2017 Routine EKG is not a screening covered service except at the [facility] Pap: Every 3 yrs age 21-65 or Pap+HPV every 5 yrs age 33-67, Covered every 2 yrs up to age 79 or Yearly if High Risk   There are no preventive care reminders to display for this patient.      Chlamydia  Annually if high risk No results found for: CHLAMYDIA A link to the "[employer]. This site has a lot of good information including definitions of the different types of Advance Directives.  It also has the State forms available on it's website for anyone to review and print using their home tablet by mouth three times daily   Commonly known as:  cyclobenzaprine           gabapentin 800 MG Tabs   TAKE 1 TABLET(800 MG) BY MOUTH THREE TIMES DAILY   Commonly known as:  NEURONTIN           glimepiride 4 MG Tabs   TAKE 1 TABLET BY MOUTH TWICE DAILY If you have questions, you can call [phone] to talk to our [facility] Staff. Remember, [employer] is NOT to be used for urgent needs. For medical emergencies, dial 911. Visit [email or web address] to learn more.         Educational Infor